# Patient Record
Sex: MALE | Race: WHITE | NOT HISPANIC OR LATINO | Employment: OTHER | URBAN - METROPOLITAN AREA
[De-identification: names, ages, dates, MRNs, and addresses within clinical notes are randomized per-mention and may not be internally consistent; named-entity substitution may affect disease eponyms.]

---

## 2021-01-01 ENCOUNTER — APPOINTMENT (OUTPATIENT)
Dept: CT IMAGING | Facility: HOSPITAL | Age: 71
End: 2021-01-01

## 2021-01-01 ENCOUNTER — APPOINTMENT (OUTPATIENT)
Dept: GENERAL RADIOLOGY | Facility: HOSPITAL | Age: 71
End: 2021-01-01

## 2021-01-01 ENCOUNTER — APPOINTMENT (OUTPATIENT)
Dept: CARDIOLOGY | Facility: HOSPITAL | Age: 71
End: 2021-01-01

## 2021-01-01 ENCOUNTER — HOSPITAL ENCOUNTER (INPATIENT)
Facility: HOSPITAL | Age: 71
LOS: 2 days | End: 2021-02-02
Attending: EMERGENCY MEDICINE | Admitting: INTERNAL MEDICINE

## 2021-01-01 VITALS
BODY MASS INDEX: 29.07 KG/M2 | TEMPERATURE: 99.4 F | WEIGHT: 185.19 LBS | SYSTOLIC BLOOD PRESSURE: 98 MMHG | HEART RATE: 121 BPM | HEIGHT: 67 IN | RESPIRATION RATE: 28 BRPM | OXYGEN SATURATION: 95 % | DIASTOLIC BLOOD PRESSURE: 69 MMHG

## 2021-01-01 DIAGNOSIS — E87.6 HYPOKALEMIA: ICD-10-CM

## 2021-01-01 DIAGNOSIS — J96.02 ACUTE RESPIRATORY FAILURE WITH HYPERCAPNIA (HCC): Primary | ICD-10-CM

## 2021-01-01 DIAGNOSIS — R77.8 ELEVATED TROPONIN: ICD-10-CM

## 2021-01-01 DIAGNOSIS — A41.9 SEPTIC SHOCK (HCC): ICD-10-CM

## 2021-01-01 DIAGNOSIS — R65.21 SEPTIC SHOCK (HCC): ICD-10-CM

## 2021-01-01 DIAGNOSIS — R79.89 ELEVATED D-DIMER: ICD-10-CM

## 2021-01-01 LAB
ALBUMIN SERPL-MCNC: 1.8 G/DL (ref 3.5–5.2)
ALBUMIN SERPL-MCNC: 2.3 G/DL (ref 3.5–5.2)
ALBUMIN SERPL-MCNC: 2.3 G/DL (ref 3.5–5.2)
ALBUMIN SERPL-MCNC: 2.4 G/DL (ref 3.5–5.2)
ALBUMIN/GLOB SERPL: 1 G/DL
ALBUMIN/GLOB SERPL: 1.1 G/DL
ALP SERPL-CCNC: 139 U/L (ref 39–117)
ALP SERPL-CCNC: 81 U/L (ref 39–117)
ALT SERPL W P-5'-P-CCNC: 46 U/L (ref 1–41)
ALT SERPL W P-5'-P-CCNC: >7000 U/L (ref 1–41)
ANION GAP SERPL CALCULATED.3IONS-SCNC: 13 MMOL/L (ref 5–15)
ANION GAP SERPL CALCULATED.3IONS-SCNC: 20 MMOL/L (ref 5–15)
ANION GAP SERPL CALCULATED.3IONS-SCNC: 22 MMOL/L (ref 5–15)
ANION GAP SERPL CALCULATED.3IONS-SCNC: 23 MMOL/L (ref 5–15)
ANION GAP SERPL CALCULATED.3IONS-SCNC: 26 MMOL/L (ref 5–15)
APTT PPP: 137.2 SECONDS (ref 61–76.5)
APTT PPP: 32.5 SECONDS (ref 24–31)
APTT PPP: 54.7 SECONDS (ref 61–76.5)
APTT PPP: 57 SECONDS (ref 61–76.5)
APTT PPP: >139 SECONDS (ref 61–76.5)
ARTERIAL PATENCY WRIST A: ABNORMAL
ARTERIAL PATENCY WRIST A: POSITIVE
AST SERPL-CCNC: 60 U/L (ref 1–40)
AST SERPL-CCNC: >7000 U/L (ref 1–40)
ATMOSPHERIC PRESS: ABNORMAL MM[HG]
B PARAPERT DNA SPEC QL NAA+PROBE: NOT DETECTED
B PERT DNA SPEC QL NAA+PROBE: NOT DETECTED
BACTERIA SPEC AEROBE CULT: NO GROWTH
BACTERIA SPEC RESP CULT: NORMAL
BACTERIA UR QL AUTO: ABNORMAL /HPF
BASE EXCESS BLDA CALC-SCNC: -13 MMOL/L (ref 0–3)
BASE EXCESS BLDA CALC-SCNC: -13.4 MMOL/L (ref 0–3)
BASE EXCESS BLDA CALC-SCNC: -13.5 MMOL/L (ref 0–3)
BASE EXCESS BLDA CALC-SCNC: -4.5 MMOL/L (ref 0–3)
BASE EXCESS BLDA CALC-SCNC: -5.8 MMOL/L (ref 0–3)
BASE EXCESS BLDA CALC-SCNC: -5.9 MMOL/L (ref 0–3)
BASE EXCESS BLDA CALC-SCNC: -7 MMOL/L (ref 0–3)
BASE EXCESS BLDA CALC-SCNC: -7.3 MMOL/L (ref 0–3)
BASE EXCESS BLDA CALC-SCNC: -8.1 MMOL/L (ref 0–3)
BASOPHILS # BLD AUTO: 0 10*3/MM3 (ref 0–0.2)
BASOPHILS # BLD AUTO: 0.1 10*3/MM3 (ref 0–0.2)
BASOPHILS # BLD AUTO: 0.1 10*3/MM3 (ref 0–0.2)
BASOPHILS NFR BLD AUTO: 0.1 % (ref 0–1.5)
BASOPHILS NFR BLD AUTO: 0.5 % (ref 0–1.5)
BASOPHILS NFR BLD AUTO: 0.7 % (ref 0–1.5)
BDY SITE: ABNORMAL
BH CV ECHO MEAS - % IVS THICK: 71.2 %
BH CV ECHO MEAS - % LVPW THICK: 74 %
BH CV ECHO MEAS - ACS: 1.5 CM
BH CV ECHO MEAS - AO MAX PG (FULL): 7.5 MMHG
BH CV ECHO MEAS - AO MAX PG: 9.8 MMHG
BH CV ECHO MEAS - AO MEAN PG (FULL): 3.3 MMHG
BH CV ECHO MEAS - AO MEAN PG: 4.7 MMHG
BH CV ECHO MEAS - AO ROOT AREA (BSA CORRECTED): 1.5
BH CV ECHO MEAS - AO ROOT AREA: 7.2 CM^2
BH CV ECHO MEAS - AO ROOT DIAM: 3 CM
BH CV ECHO MEAS - AO V2 MAX: 154.1 CM/SEC
BH CV ECHO MEAS - AO V2 MEAN: 101.1 CM/SEC
BH CV ECHO MEAS - AO V2 VTI: 23.5 CM
BH CV ECHO MEAS - AVA(I,A): 1.8 CM^2
BH CV ECHO MEAS - AVA(I,D): 1.8 CM^2
BH CV ECHO MEAS - AVA(V,A): 1.8 CM^2
BH CV ECHO MEAS - AVA(V,D): 1.8 CM^2
BH CV ECHO MEAS - BSA(HAYCOCK): 2.1 M^2
BH CV ECHO MEAS - BSA: 2 M^2
BH CV ECHO MEAS - BZI_BMI: 30.4 KILOGRAMS/M^2
BH CV ECHO MEAS - BZI_METRIC_HEIGHT: 170.2 CM
BH CV ECHO MEAS - BZI_METRIC_WEIGHT: 88 KG
BH CV ECHO MEAS - EDV(CUBED): 176.5 ML
BH CV ECHO MEAS - EDV(TEICH): 154.3 ML
BH CV ECHO MEAS - EF(CUBED): 87.9 %
BH CV ECHO MEAS - EF(TEICH): 81.3 %
BH CV ECHO MEAS - ESV(CUBED): 21.3 ML
BH CV ECHO MEAS - ESV(TEICH): 28.9 ML
BH CV ECHO MEAS - FS: 50.6 %
BH CV ECHO MEAS - IVS/LVPW: 1.2
BH CV ECHO MEAS - IVSD: 1.2 CM
BH CV ECHO MEAS - IVSS: 2.1 CM
BH CV ECHO MEAS - LA DIMENSION(2D): 4.2 CM
BH CV ECHO MEAS - LV MASS(C)D: 254.6 GRAMS
BH CV ECHO MEAS - LV MASS(C)DI: 127.5 GRAMS/M^2
BH CV ECHO MEAS - LV MASS(C)S: 224.1 GRAMS
BH CV ECHO MEAS - LV MASS(C)SI: 112.3 GRAMS/M^2
BH CV ECHO MEAS - LV MAX PG: 2.3 MMHG
BH CV ECHO MEAS - LV MEAN PG: 1.4 MMHG
BH CV ECHO MEAS - LV V1 MAX: 76.5 CM/SEC
BH CV ECHO MEAS - LV V1 MEAN: 55.6 CM/SEC
BH CV ECHO MEAS - LV V1 VTI: 12.1 CM
BH CV ECHO MEAS - LVIDD: 5.6 CM
BH CV ECHO MEAS - LVIDS: 2.8 CM
BH CV ECHO MEAS - LVOT AREA: 3.5 CM^2
BH CV ECHO MEAS - LVOT DIAM: 2.1 CM
BH CV ECHO MEAS - LVPWD: 1 CM
BH CV ECHO MEAS - LVPWS: 1.7 CM
BH CV ECHO MEAS - MV A MAX VEL: 107.4 CM/SEC
BH CV ECHO MEAS - MV DEC SLOPE: 806.5 CM/SEC^2
BH CV ECHO MEAS - MV DEC TIME: 0.18 SEC
BH CV ECHO MEAS - MV E MAX VEL: 71.6 CM/SEC
BH CV ECHO MEAS - MV E/A: 0.67
BH CV ECHO MEAS - MV MAX PG: 11.5 MMHG
BH CV ECHO MEAS - MV MEAN PG: 4.9 MMHG
BH CV ECHO MEAS - MV V2 MAX: 169.4 CM/SEC
BH CV ECHO MEAS - MV V2 MEAN: 103.4 CM/SEC
BH CV ECHO MEAS - MV V2 VTI: 36.8 CM
BH CV ECHO MEAS - MVA(VTI): 1.2 CM^2
BH CV ECHO MEAS - PA ACC TIME: 0.08 SEC
BH CV ECHO MEAS - PA MAX PG (FULL): 1.7 MMHG
BH CV ECHO MEAS - PA MAX PG: 3 MMHG
BH CV ECHO MEAS - PA MEAN PG (FULL): 1.2 MMHG
BH CV ECHO MEAS - PA MEAN PG: 1.9 MMHG
BH CV ECHO MEAS - PA PR(ACCEL): 44.7 MMHG
BH CV ECHO MEAS - PA V2 MAX: 87.2 CM/SEC
BH CV ECHO MEAS - PA V2 MEAN: 65.9 CM/SEC
BH CV ECHO MEAS - PA V2 VTI: 12.5 CM
BH CV ECHO MEAS - RAP SYSTOLE: 3 MMHG
BH CV ECHO MEAS - RV MAX PG: 1.4 MMHG
BH CV ECHO MEAS - RV MEAN PG: 0.64 MMHG
BH CV ECHO MEAS - RV V1 MAX: 58.1 CM/SEC
BH CV ECHO MEAS - RV V1 MEAN: 38 CM/SEC
BH CV ECHO MEAS - RV V1 VTI: 6.2 CM
BH CV ECHO MEAS - RVDD: 2.2 CM
BH CV ECHO MEAS - RVSP: 34.3 MMHG
BH CV ECHO MEAS - SI(AO): 85.1 ML/M^2
BH CV ECHO MEAS - SI(CUBED): 77.7 ML/M^2
BH CV ECHO MEAS - SI(LVOT): 21.6 ML/M^2
BH CV ECHO MEAS - SI(TEICH): 62.8 ML/M^2
BH CV ECHO MEAS - SV(AO): 169.8 ML
BH CV ECHO MEAS - SV(CUBED): 155.2 ML
BH CV ECHO MEAS - SV(LVOT): 43 ML
BH CV ECHO MEAS - SV(TEICH): 125.4 ML
BH CV ECHO MEAS - TR MAX VEL: 279.4 CM/SEC
BILIRUB SERPL-MCNC: 1.1 MG/DL (ref 0–1.2)
BILIRUB SERPL-MCNC: <0.2 MG/DL (ref 0–1.2)
BILIRUB UR QL STRIP: NEGATIVE
BUN SERPL-MCNC: 13 MG/DL (ref 8–23)
BUN SERPL-MCNC: 31 MG/DL (ref 8–23)
BUN SERPL-MCNC: 34 MG/DL (ref 8–23)
BUN SERPL-MCNC: 37 MG/DL (ref 8–23)
BUN SERPL-MCNC: 38 MG/DL (ref 8–23)
BUN/CREAT SERPL: 10.1 (ref 7–25)
BUN/CREAT SERPL: 10.9 (ref 7–25)
BUN/CREAT SERPL: 10.9 (ref 7–25)
BUN/CREAT SERPL: 11.5 (ref 7–25)
BUN/CREAT SERPL: 19.1 (ref 7–25)
C PNEUM DNA NPH QL NAA+NON-PROBE: NOT DETECTED
CA-I SERPL ISE-MCNC: 0.81 MMOL/L (ref 1.2–1.3)
CA-I SERPL ISE-MCNC: 0.85 MMOL/L (ref 1.2–1.3)
CA-I SERPL ISE-MCNC: 0.86 MMOL/L (ref 1.2–1.3)
CA-I SERPL ISE-MCNC: 1.15 MMOL/L (ref 1.2–1.3)
CALCIUM SPEC-SCNC: 4.9 MG/DL (ref 8.6–10.5)
CALCIUM SPEC-SCNC: 6.2 MG/DL (ref 8.6–10.5)
CALCIUM SPEC-SCNC: 6.4 MG/DL (ref 8.6–10.5)
CALCIUM SPEC-SCNC: 6.5 MG/DL (ref 8.6–10.5)
CALCIUM SPEC-SCNC: 6.6 MG/DL (ref 8.6–10.5)
CHLORIDE SERPL-SCNC: 117 MMOL/L (ref 98–107)
CHLORIDE SERPL-SCNC: 93 MMOL/L (ref 98–107)
CHLORIDE SERPL-SCNC: 94 MMOL/L (ref 98–107)
CHLORIDE SERPL-SCNC: 97 MMOL/L (ref 98–107)
CHLORIDE SERPL-SCNC: 98 MMOL/L (ref 98–107)
CHOLEST SERPL-MCNC: 131 MG/DL (ref 0–200)
CK SERPL-CCNC: 728 U/L (ref 20–200)
CLARITY UR: ABNORMAL
CO2 BLDA-SCNC: 16.7 MMOL/L (ref 22–29)
CO2 BLDA-SCNC: 17 MMOL/L (ref 22–29)
CO2 BLDA-SCNC: 22.4 MMOL/L (ref 22–29)
CO2 BLDA-SCNC: 23.2 MMOL/L (ref 22–29)
CO2 BLDA-SCNC: 25.1 MMOL/L (ref 22–29)
CO2 BLDA-SCNC: 25.3 MMOL/L (ref 22–29)
CO2 BLDA-SCNC: 26 MMOL/L (ref 22–29)
CO2 BLDA-SCNC: 27.8 MMOL/L (ref 22–29)
CO2 BLDA-SCNC: 29.3 MMOL/L (ref 22–29)
CO2 SERPL-SCNC: 16 MMOL/L (ref 22–29)
CO2 SERPL-SCNC: 17 MMOL/L (ref 22–29)
CO2 SERPL-SCNC: 19 MMOL/L (ref 22–29)
CO2 SERPL-SCNC: 21 MMOL/L (ref 22–29)
CO2 SERPL-SCNC: 21 MMOL/L (ref 22–29)
COLOR UR: ABNORMAL
CREAT SERPL-MCNC: 0.68 MG/DL (ref 0.76–1.27)
CREAT SERPL-MCNC: 2.69 MG/DL (ref 0.76–1.27)
CREAT SERPL-MCNC: 3.11 MG/DL (ref 0.76–1.27)
CREAT SERPL-MCNC: 3.38 MG/DL (ref 0.76–1.27)
CREAT SERPL-MCNC: 3.76 MG/DL (ref 0.76–1.27)
CRP SERPL-MCNC: 1.1 MG/DL (ref 0–0.5)
D DIMER PPP FEU-MCNC: 9.51 MG/L (FEU) (ref 0–0.59)
D-LACTATE SERPL-SCNC: 3.5 MMOL/L (ref 0.5–2)
D-LACTATE SERPL-SCNC: 3.6 MMOL/L (ref 0.5–2)
D-LACTATE SERPL-SCNC: 6.5 MMOL/L (ref 0.5–2)
D-LACTATE SERPL-SCNC: 8.8 MMOL/L (ref 0.5–2)
DEPRECATED RDW RBC AUTO: 52.9 FL (ref 37–54)
DEPRECATED RDW RBC AUTO: 53.8 FL (ref 37–54)
DEPRECATED RDW RBC AUTO: 54.7 FL (ref 37–54)
EOSINOPHIL # BLD AUTO: 0 10*3/MM3 (ref 0–0.4)
EOSINOPHIL # BLD AUTO: 0 10*3/MM3 (ref 0–0.4)
EOSINOPHIL # BLD AUTO: 0.2 10*3/MM3 (ref 0–0.4)
EOSINOPHIL NFR BLD AUTO: 0 % (ref 0.3–6.2)
EOSINOPHIL NFR BLD AUTO: 0.2 % (ref 0.3–6.2)
EOSINOPHIL NFR BLD AUTO: 2.3 % (ref 0.3–6.2)
ERYTHROCYTE [DISTWIDTH] IN BLOOD BY AUTOMATED COUNT: 15.9 % (ref 12.3–15.4)
ERYTHROCYTE [DISTWIDTH] IN BLOOD BY AUTOMATED COUNT: 16 % (ref 12.3–15.4)
ERYTHROCYTE [DISTWIDTH] IN BLOOD BY AUTOMATED COUNT: 16.4 % (ref 12.3–15.4)
ETHANOL UR QL: <0.01 %
FERRITIN SERPL-MCNC: 144.5 NG/ML (ref 30–400)
FLUAV SUBTYP SPEC NAA+PROBE: NOT DETECTED
FLUBV RNA ISLT QL NAA+PROBE: NOT DETECTED
GFR SERPL CREATININE-BSD FRML MDRD: 115 ML/MIN/1.73
GFR SERPL CREATININE-BSD FRML MDRD: 16 ML/MIN/1.73
GFR SERPL CREATININE-BSD FRML MDRD: 18 ML/MIN/1.73
GFR SERPL CREATININE-BSD FRML MDRD: 20 ML/MIN/1.73
GFR SERPL CREATININE-BSD FRML MDRD: 24 ML/MIN/1.73
GLOBULIN UR ELPH-MCNC: 1.6 GM/DL
GLOBULIN UR ELPH-MCNC: 2.2 GM/DL
GLUCOSE BLDC GLUCOMTR-MCNC: 131 MG/DL (ref 70–105)
GLUCOSE BLDC GLUCOMTR-MCNC: 144 MG/DL (ref 70–105)
GLUCOSE BLDC GLUCOMTR-MCNC: 183 MG/DL (ref 70–105)
GLUCOSE BLDC GLUCOMTR-MCNC: 186 MG/DL (ref 70–105)
GLUCOSE BLDC GLUCOMTR-MCNC: 186 MG/DL (ref 70–105)
GLUCOSE BLDC GLUCOMTR-MCNC: 220 MG/DL (ref 70–105)
GLUCOSE BLDC GLUCOMTR-MCNC: 235 MG/DL (ref 70–105)
GLUCOSE BLDC GLUCOMTR-MCNC: 273 MG/DL (ref 70–105)
GLUCOSE BLDC GLUCOMTR-MCNC: 280 MG/DL (ref 74–100)
GLUCOSE BLDC GLUCOMTR-MCNC: 385 MG/DL (ref 74–100)
GLUCOSE SERPL-MCNC: 184 MG/DL (ref 65–99)
GLUCOSE SERPL-MCNC: 184 MG/DL (ref 65–99)
GLUCOSE SERPL-MCNC: 198 MG/DL (ref 65–99)
GLUCOSE SERPL-MCNC: 303 MG/DL (ref 65–99)
GLUCOSE SERPL-MCNC: 319 MG/DL (ref 65–99)
GLUCOSE UR STRIP-MCNC: ABNORMAL MG/DL
GRAM STN SPEC: NORMAL
HADV DNA SPEC NAA+PROBE: NOT DETECTED
HBA1C MFR BLD: 5.7 % (ref 3.5–5.6)
HCO3 BLDA-SCNC: 15.4 MMOL/L (ref 21–28)
HCO3 BLDA-SCNC: 15.6 MMOL/L (ref 21–28)
HCO3 BLDA-SCNC: 20.8 MMOL/L (ref 21–28)
HCO3 BLDA-SCNC: 21.7 MMOL/L (ref 21–28)
HCO3 BLDA-SCNC: 22.2 MMOL/L (ref 21–28)
HCO3 BLDA-SCNC: 22.9 MMOL/L (ref 21–28)
HCO3 BLDA-SCNC: 24.3 MMOL/L (ref 21–28)
HCO3 BLDA-SCNC: 25.1 MMOL/L (ref 21–28)
HCO3 BLDA-SCNC: 26.6 MMOL/L (ref 21–28)
HCOV 229E RNA SPEC QL NAA+PROBE: NOT DETECTED
HCOV HKU1 RNA SPEC QL NAA+PROBE: NOT DETECTED
HCOV NL63 RNA SPEC QL NAA+PROBE: NOT DETECTED
HCOV OC43 RNA SPEC QL NAA+PROBE: NOT DETECTED
HCT VFR BLD AUTO: 42.4 % (ref 37.5–51)
HCT VFR BLD AUTO: 43.4 % (ref 37.5–51)
HCT VFR BLD AUTO: 46.5 % (ref 37.5–51)
HDLC SERPL-MCNC: 32 MG/DL (ref 40–60)
HEMODILUTION: NO
HGB BLD-MCNC: 13.6 G/DL (ref 13–17.7)
HGB BLD-MCNC: 13.9 G/DL (ref 13–17.7)
HGB BLD-MCNC: 15.1 G/DL (ref 13–17.7)
HGB UR QL STRIP.AUTO: ABNORMAL
HMPV RNA NPH QL NAA+NON-PROBE: NOT DETECTED
HOLD SPECIMEN: NORMAL
HOLD SPECIMEN: NORMAL
HPIV1 RNA SPEC QL NAA+PROBE: NOT DETECTED
HPIV2 RNA SPEC QL NAA+PROBE: NOT DETECTED
HPIV3 RNA NPH QL NAA+PROBE: NOT DETECTED
HPIV4 P GENE NPH QL NAA+PROBE: NOT DETECTED
HYALINE CASTS UR QL AUTO: ABNORMAL /LPF
INHALED O2 CONCENTRATION: 100 %
INR PPP: 1.23 (ref 0.93–1.1)
KETONES UR QL STRIP: NEGATIVE
L PNEUMO1 AG UR QL IA: NEGATIVE
LACTATE HOLD SPECIMEN: NORMAL
LDH SERPL-CCNC: 266 U/L (ref 135–225)
LDLC SERPL CALC-MCNC: 88 MG/DL (ref 0–100)
LDLC/HDLC SERPL: 2.78 {RATIO}
LEUKOCYTE ESTERASE UR QL STRIP.AUTO: ABNORMAL
LYMPHOCYTES # BLD AUTO: 0.5 10*3/MM3 (ref 0.7–3.1)
LYMPHOCYTES # BLD AUTO: 0.6 10*3/MM3 (ref 0.7–3.1)
LYMPHOCYTES # BLD AUTO: 2.9 10*3/MM3 (ref 0.7–3.1)
LYMPHOCYTES NFR BLD AUTO: 3.4 % (ref 19.6–45.3)
LYMPHOCYTES NFR BLD AUTO: 3.5 % (ref 19.6–45.3)
LYMPHOCYTES NFR BLD AUTO: 36.7 % (ref 19.6–45.3)
M PNEUMO IGG SER IA-ACNC: NOT DETECTED
MAGNESIUM SERPL-MCNC: 1.1 MG/DL (ref 1.6–2.4)
MAGNESIUM SERPL-MCNC: 2.1 MG/DL (ref 1.6–2.4)
MAGNESIUM SERPL-MCNC: 2.2 MG/DL (ref 1.6–2.4)
MAGNESIUM SERPL-MCNC: 2.2 MG/DL (ref 1.6–2.4)
MAGNESIUM SERPL-MCNC: 2.3 MG/DL (ref 1.6–2.4)
MAXIMAL PREDICTED HEART RATE: 150 BPM
MCH RBC QN AUTO: 29.7 PG (ref 26.6–33)
MCH RBC QN AUTO: 29.7 PG (ref 26.6–33)
MCH RBC QN AUTO: 30.6 PG (ref 26.6–33)
MCHC RBC AUTO-ENTMCNC: 32 G/DL (ref 31.5–35.7)
MCHC RBC AUTO-ENTMCNC: 32 G/DL (ref 31.5–35.7)
MCHC RBC AUTO-ENTMCNC: 32.5 G/DL (ref 31.5–35.7)
MCV RBC AUTO: 92.6 FL (ref 79–97)
MCV RBC AUTO: 92.8 FL (ref 79–97)
MCV RBC AUTO: 94.2 FL (ref 79–97)
MODALITY: ABNORMAL
MONOCYTES # BLD AUTO: 0.2 10*3/MM3 (ref 0.1–0.9)
MONOCYTES # BLD AUTO: 0.4 10*3/MM3 (ref 0.1–0.9)
MONOCYTES # BLD AUTO: 0.4 10*3/MM3 (ref 0.1–0.9)
MONOCYTES NFR BLD AUTO: 1.7 % (ref 5–12)
MONOCYTES NFR BLD AUTO: 2.2 % (ref 5–12)
MONOCYTES NFR BLD AUTO: 5 % (ref 5–12)
MRSA DNA SPEC QL NAA+PROBE: NORMAL
MUCOUS THREADS URNS QL MICRO: ABNORMAL /HPF
MYOGLOBIN SERPL-MCNC: 1045 NG/ML (ref 28–72)
NEUTROPHILS NFR BLD AUTO: 12.7 10*3/MM3 (ref 1.7–7)
NEUTROPHILS NFR BLD AUTO: 16.3 10*3/MM3 (ref 1.7–7)
NEUTROPHILS NFR BLD AUTO: 4.4 10*3/MM3 (ref 1.7–7)
NEUTROPHILS NFR BLD AUTO: 55.3 % (ref 42.7–76)
NEUTROPHILS NFR BLD AUTO: 94.2 % (ref 42.7–76)
NEUTROPHILS NFR BLD AUTO: 94.2 % (ref 42.7–76)
NITRITE UR QL STRIP: NEGATIVE
NRBC BLD AUTO-RTO: 0.1 /100 WBC (ref 0–0.2)
NRBC BLD AUTO-RTO: 0.1 /100 WBC (ref 0–0.2)
NRBC BLD AUTO-RTO: 0.3 /100 WBC (ref 0–0.2)
NT-PROBNP SERPL-MCNC: 2102 PG/ML (ref 0–900)
PCO2 BLDA: 101.6 MM HG (ref 35–48)
PCO2 BLDA: 43.2 MM HG (ref 35–48)
PCO2 BLDA: 46.8 MM HG (ref 35–48)
PCO2 BLDA: 49.3 MM HG (ref 35–48)
PCO2 BLDA: 50.6 MM HG (ref 35–48)
PCO2 BLDA: 57.6 MM HG (ref 35–48)
PCO2 BLDA: 71.5 MM HG (ref 35–48)
PCO2 BLDA: 88.1 MM HG (ref 35–48)
PCO2 BLDA: 88.1 MM HG (ref 35–48)
PEEP RESPIRATORY: 10 CM[H2O]
PEEP RESPIRATORY: 10 CM[H2O]
PEEP RESPIRATORY: 14 CM[H2O]
PEEP RESPIRATORY: 7 CM[H2O]
PEEP RESPIRATORY: 7 CM[H2O]
PEEP RESPIRATORY: 8 CM[H2O]
PH BLDA: 6.95 PH UNITS (ref 7.35–7.45)
PH BLDA: 7.06 PH UNITS (ref 7.35–7.45)
PH BLDA: 7.09 PH UNITS (ref 7.35–7.45)
PH BLDA: 7.11 PH UNITS (ref 7.35–7.45)
PH BLDA: 7.13 PH UNITS (ref 7.35–7.45)
PH BLDA: 7.16 PH UNITS (ref 7.35–7.45)
PH BLDA: 7.22 PH UNITS (ref 7.35–7.45)
PH BLDA: 7.23 PH UNITS (ref 7.35–7.45)
PH BLDA: 7.25 PH UNITS (ref 7.35–7.45)
PH UR STRIP.AUTO: 7 [PH] (ref 5–8)
PHOSPHATE SERPL-MCNC: 4.2 MG/DL (ref 2.5–4.5)
PHOSPHATE SERPL-MCNC: 7.9 MG/DL (ref 2.5–4.5)
PHOSPHATE SERPL-MCNC: 8 MG/DL (ref 2.5–4.5)
PHOSPHATE SERPL-MCNC: 8.1 MG/DL (ref 2.5–4.5)
PHOSPHATE SERPL-MCNC: 8.3 MG/DL (ref 2.5–4.5)
PLATELET # BLD AUTO: 118 10*3/MM3 (ref 140–450)
PLATELET # BLD AUTO: 163 10*3/MM3 (ref 140–450)
PLATELET # BLD AUTO: 271 10*3/MM3 (ref 140–450)
PMV BLD AUTO: 7.8 FL (ref 6–12)
PMV BLD AUTO: 7.9 FL (ref 6–12)
PMV BLD AUTO: 8.7 FL (ref 6–12)
PO2 BLDA: 102.4 MM HG (ref 83–108)
PO2 BLDA: 111.4 MM HG (ref 83–108)
PO2 BLDA: 142.9 MM HG (ref 83–108)
PO2 BLDA: 42.3 MM HG (ref 83–108)
PO2 BLDA: 61.1 MM HG (ref 83–108)
PO2 BLDA: 65 MM HG (ref 83–108)
PO2 BLDA: 69.9 MM HG (ref 83–108)
PO2 BLDA: 77.8 MM HG (ref 83–108)
PO2 BLDA: 80.2 MM HG (ref 83–108)
POTASSIUM SERPL-SCNC: 2.4 MMOL/L (ref 3.5–5.2)
POTASSIUM SERPL-SCNC: 4.4 MMOL/L (ref 3.5–5.2)
POTASSIUM SERPL-SCNC: 4.6 MMOL/L (ref 3.5–5.2)
PROCALCITONIN SERPL-MCNC: 0.03 NG/ML (ref 0–0.25)
PROT SERPL-MCNC: 3.4 G/DL (ref 6–8.5)
PROT SERPL-MCNC: 4.5 G/DL (ref 6–8.5)
PROT UR QL STRIP: ABNORMAL
PROTHROMBIN TIME: 13.4 SECONDS (ref 9.6–11.7)
QT INTERVAL: 349 MS
QT INTERVAL: 397 MS
RBC # BLD AUTO: 4.57 10*6/MM3 (ref 4.14–5.8)
RBC # BLD AUTO: 4.69 10*6/MM3 (ref 4.14–5.8)
RBC # BLD AUTO: 4.94 10*6/MM3 (ref 4.14–5.8)
RBC # UR: ABNORMAL /HPF
REF LAB TEST METHOD: ABNORMAL
RESPIRATORY RATE: 26
RESPIRATORY RATE: 26
RESPIRATORY RATE: 28
RESPIRATORY RATE: 32
RESPIRATORY RATE: 32
RHINOVIRUS RNA SPEC NAA+PROBE: NOT DETECTED
RSV RNA NPH QL NAA+NON-PROBE: NOT DETECTED
S PNEUM AG SPEC QL LA: POSITIVE
SAO2 % BLDCOA: 67.7 % (ref 94–98)
SAO2 % BLDCOA: 81.5 % (ref 94–98)
SAO2 % BLDCOA: 82.3 % (ref 94–98)
SAO2 % BLDCOA: 90.1 % (ref 94–98)
SAO2 % BLDCOA: 90.6 % (ref 94–98)
SAO2 % BLDCOA: 92.3 % (ref 94–98)
SAO2 % BLDCOA: 95.2 % (ref 94–98)
SAO2 % BLDCOA: 95.8 % (ref 94–98)
SAO2 % BLDCOA: 96.7 % (ref 94–98)
SARS-COV-2 RNA NPH QL NAA+NON-PROBE: NOT DETECTED
SODIUM SERPL-SCNC: 137 MMOL/L (ref 136–145)
SODIUM SERPL-SCNC: 137 MMOL/L (ref 136–145)
SODIUM SERPL-SCNC: 138 MMOL/L (ref 136–145)
SODIUM SERPL-SCNC: 138 MMOL/L (ref 136–145)
SODIUM SERPL-SCNC: 147 MMOL/L (ref 136–145)
SP GR UR STRIP: 1.03 (ref 1–1.03)
SQUAMOUS #/AREA URNS HPF: ABNORMAL /HPF
STRESS TARGET HR: 128 BPM
TRIGL SERPL-MCNC: 51 MG/DL (ref 0–150)
TRIGL SERPL-MCNC: 97 MG/DL (ref 0–150)
TROPONIN T SERPL-MCNC: 1.03 NG/ML (ref 0–0.03)
TROPONIN T SERPL-MCNC: 1.51 NG/ML (ref 0–0.03)
TROPONIN T SERPL-MCNC: 2.3 NG/ML (ref 0–0.03)
TROPONIN T SERPL-MCNC: 2.3 NG/ML (ref 0–0.03)
URINE MYOGLOBIN, QUALITATIVE: POSITIVE
UROBILINOGEN UR QL STRIP: ABNORMAL
VENTILATOR MODE: ABNORMAL
VLDLC SERPL-MCNC: 11 MG/DL (ref 5–40)
VT ON VENT VENT: 500 ML
VT ON VENT VENT: 500 ML
VT ON VENT VENT: 550 ML
WBC # BLD AUTO: 13.5 10*3/MM3 (ref 3.4–10.8)
WBC # BLD AUTO: 17.3 10*3/MM3 (ref 3.4–10.8)
WBC # BLD AUTO: 8 10*3/MM3 (ref 3.4–10.8)
WBC UR QL AUTO: ABNORMAL /HPF
WHOLE BLOOD HOLD SPECIMEN: NORMAL
WHOLE BLOOD HOLD SPECIMEN: NORMAL

## 2021-01-01 PROCEDURE — 25010000002 MIDAZOLAM PER 1 MG: Performed by: EMERGENCY MEDICINE

## 2021-01-01 PROCEDURE — 25010000002 MIDAZOLAM 50 MG/10ML SOLUTION: Performed by: INTERNAL MEDICINE

## 2021-01-01 PROCEDURE — 25010000002 CEFTRIAXONE PER 250 MG: Performed by: EMERGENCY MEDICINE

## 2021-01-01 PROCEDURE — 94640 AIRWAY INHALATION TREATMENT: CPT

## 2021-01-01 PROCEDURE — 82962 GLUCOSE BLOOD TEST: CPT

## 2021-01-01 PROCEDURE — 82330 ASSAY OF CALCIUM: CPT | Performed by: INTERNAL MEDICINE

## 2021-01-01 PROCEDURE — 25010000002 FENTANYL CITRATE (PF) 100 MCG/2ML SOLUTION: Performed by: NURSE PRACTITIONER

## 2021-01-01 PROCEDURE — 36600 WITHDRAWAL OF ARTERIAL BLOOD: CPT

## 2021-01-01 PROCEDURE — 25010000003 POTASSIUM CHLORIDE 10 MEQ/100ML SOLUTION: Performed by: EMERGENCY MEDICINE

## 2021-01-01 PROCEDURE — 81001 URINALYSIS AUTO W/SCOPE: CPT | Performed by: EMERGENCY MEDICINE

## 2021-01-01 PROCEDURE — 83735 ASSAY OF MAGNESIUM: CPT | Performed by: EMERGENCY MEDICINE

## 2021-01-01 PROCEDURE — 71045 X-RAY EXAM CHEST 1 VIEW: CPT

## 2021-01-01 PROCEDURE — 99233 SBSQ HOSP IP/OBS HIGH 50: CPT | Performed by: INTERNAL MEDICINE

## 2021-01-01 PROCEDURE — 80048 BASIC METABOLIC PNL TOTAL CA: CPT | Performed by: NURSE PRACTITIONER

## 2021-01-01 PROCEDURE — 82803 BLOOD GASES ANY COMBINATION: CPT

## 2021-01-01 PROCEDURE — 94799 UNLISTED PULMONARY SVC/PX: CPT

## 2021-01-01 PROCEDURE — 87070 CULTURE OTHR SPECIMN AEROBIC: CPT | Performed by: NURSE PRACTITIONER

## 2021-01-01 PROCEDURE — 85730 THROMBOPLASTIN TIME PARTIAL: CPT | Performed by: INTERNAL MEDICINE

## 2021-01-01 PROCEDURE — 85025 COMPLETE CBC W/AUTO DIFF WBC: CPT | Performed by: EMERGENCY MEDICINE

## 2021-01-01 PROCEDURE — 25010000002 HYDROCORTISONE SODIUM SUCCINATE 100 MG RECONSTITUTED SOLUTION: Performed by: INTERNAL MEDICINE

## 2021-01-01 PROCEDURE — 86140 C-REACTIVE PROTEIN: CPT | Performed by: EMERGENCY MEDICINE

## 2021-01-01 PROCEDURE — 25010000002 EPOPROSTENOL PER 0.5 MG: Performed by: NURSE PRACTITIONER

## 2021-01-01 PROCEDURE — 99291 CRITICAL CARE FIRST HOUR: CPT

## 2021-01-01 PROCEDURE — 85610 PROTHROMBIN TIME: CPT | Performed by: EMERGENCY MEDICINE

## 2021-01-01 PROCEDURE — 25010000002 LORAZEPAM PER 2 MG: Performed by: NURSE PRACTITIONER

## 2021-01-01 PROCEDURE — 25010000002 HEPARIN (PORCINE) 25000-0.45 UT/250ML-% SOLUTION: Performed by: EMERGENCY MEDICINE

## 2021-01-01 PROCEDURE — 25010000002 METHYLPREDNISOLONE PER 125 MG: Performed by: EMERGENCY MEDICINE

## 2021-01-01 PROCEDURE — 87641 MR-STAPH DNA AMP PROBE: CPT | Performed by: NURSE PRACTITIONER

## 2021-01-01 PROCEDURE — 25010000002 PROPOFOL 10 MG/ML EMULSION: Performed by: NURSE PRACTITIONER

## 2021-01-01 PROCEDURE — 80069 RENAL FUNCTION PANEL: CPT | Performed by: INTERNAL MEDICINE

## 2021-01-01 PROCEDURE — 5A1945Z RESPIRATORY VENTILATION, 24-96 CONSECUTIVE HOURS: ICD-10-PCS | Performed by: INTERNAL MEDICINE

## 2021-01-01 PROCEDURE — 25010000002 MORPHINE PER 10 MG: Performed by: NURSE PRACTITIONER

## 2021-01-01 PROCEDURE — 82550 ASSAY OF CK (CPK): CPT | Performed by: NURSE PRACTITIONER

## 2021-01-01 PROCEDURE — 83605 ASSAY OF LACTIC ACID: CPT

## 2021-01-01 PROCEDURE — 25010000003 AMPICILLIN-SULBACTAM PER 1.5 G: Performed by: INTERNAL MEDICINE

## 2021-01-01 PROCEDURE — 93005 ELECTROCARDIOGRAM TRACING: CPT | Performed by: EMERGENCY MEDICINE

## 2021-01-01 PROCEDURE — 03HY32Z INSERTION OF MONITORING DEVICE INTO UPPER ARTERY, PERCUTANEOUS APPROACH: ICD-10-PCS | Performed by: INTERNAL MEDICINE

## 2021-01-01 PROCEDURE — 84100 ASSAY OF PHOSPHORUS: CPT | Performed by: EMERGENCY MEDICINE

## 2021-01-01 PROCEDURE — 31500 INSERT EMERGENCY AIRWAY: CPT

## 2021-01-01 PROCEDURE — 83880 ASSAY OF NATRIURETIC PEPTIDE: CPT | Performed by: EMERGENCY MEDICINE

## 2021-01-01 PROCEDURE — 0202U NFCT DS 22 TRGT SARS-COV-2: CPT | Performed by: EMERGENCY MEDICINE

## 2021-01-01 PROCEDURE — 85025 COMPLETE CBC W/AUTO DIFF WBC: CPT | Performed by: NURSE PRACTITIONER

## 2021-01-01 PROCEDURE — 94003 VENT MGMT INPAT SUBQ DAY: CPT

## 2021-01-01 PROCEDURE — 83874 ASSAY OF MYOGLOBIN: CPT | Performed by: NURSE PRACTITIONER

## 2021-01-01 PROCEDURE — 83605 ASSAY OF LACTIC ACID: CPT | Performed by: NURSE PRACTITIONER

## 2021-01-01 PROCEDURE — 25010000002 METHYLPREDNISOLONE PER 125 MG: Performed by: NURSE PRACTITIONER

## 2021-01-01 PROCEDURE — 63710000001 INSULIN LISPRO (HUMAN) PER 5 UNITS: Performed by: INTERNAL MEDICINE

## 2021-01-01 PROCEDURE — 84100 ASSAY OF PHOSPHORUS: CPT | Performed by: NURSE PRACTITIONER

## 2021-01-01 PROCEDURE — 25010000002 PHENYLEPHRINE 10 MG/ML SOLUTION: Performed by: NURSE PRACTITIONER

## 2021-01-01 PROCEDURE — 99222 1ST HOSP IP/OBS MODERATE 55: CPT | Performed by: INTERNAL MEDICINE

## 2021-01-01 PROCEDURE — 02HV33Z INSERTION OF INFUSION DEVICE INTO SUPERIOR VENA CAVA, PERCUTANEOUS APPROACH: ICD-10-PCS | Performed by: INTERNAL MEDICINE

## 2021-01-01 PROCEDURE — 82077 ASSAY SPEC XCP UR&BREATH IA: CPT | Performed by: NURSE PRACTITIONER

## 2021-01-01 PROCEDURE — 80053 COMPREHEN METABOLIC PANEL: CPT | Performed by: INTERNAL MEDICINE

## 2021-01-01 PROCEDURE — 25010000002 DOPAMINE PER 40 MG: Performed by: NURSE PRACTITIONER

## 2021-01-01 PROCEDURE — 80061 LIPID PANEL: CPT | Performed by: NURSE PRACTITIONER

## 2021-01-01 PROCEDURE — 87205 SMEAR GRAM STAIN: CPT | Performed by: NURSE PRACTITIONER

## 2021-01-01 PROCEDURE — 74018 RADEX ABDOMEN 1 VIEW: CPT

## 2021-01-01 PROCEDURE — 25010000002 PHENYLEPHRINE 10 MG/ML SOLUTION: Performed by: INTERNAL MEDICINE

## 2021-01-01 PROCEDURE — 87086 URINE CULTURE/COLONY COUNT: CPT | Performed by: EMERGENCY MEDICINE

## 2021-01-01 PROCEDURE — 51702 INSERT TEMP BLADDER CATH: CPT

## 2021-01-01 PROCEDURE — 25010000002 MAGNESIUM SULFATE 2 GM/50ML SOLUTION: Performed by: NURSE PRACTITIONER

## 2021-01-01 PROCEDURE — 93306 TTE W/DOPPLER COMPLETE: CPT

## 2021-01-01 PROCEDURE — 82330 ASSAY OF CALCIUM: CPT | Performed by: EMERGENCY MEDICINE

## 2021-01-01 PROCEDURE — 85379 FIBRIN DEGRADATION QUANT: CPT | Performed by: EMERGENCY MEDICINE

## 2021-01-01 PROCEDURE — 25010000002 FENTANYL CITRATE (PF) 2500 MCG/50ML SOLUTION: Performed by: NURSE PRACTITIONER

## 2021-01-01 PROCEDURE — 25010000002 AZITHROMYCIN PER 500 MG: Performed by: EMERGENCY MEDICINE

## 2021-01-01 PROCEDURE — 25010000002 CEFTRIAXONE PER 250 MG: Performed by: INTERNAL MEDICINE

## 2021-01-01 PROCEDURE — 84100 ASSAY OF PHOSPHORUS: CPT | Performed by: INTERNAL MEDICINE

## 2021-01-01 PROCEDURE — 87899 AGENT NOS ASSAY W/OPTIC: CPT | Performed by: NURSE PRACTITIONER

## 2021-01-01 PROCEDURE — 80053 COMPREHEN METABOLIC PANEL: CPT | Performed by: EMERGENCY MEDICINE

## 2021-01-01 PROCEDURE — 83615 LACTATE (LD) (LDH) ENZYME: CPT | Performed by: NURSE PRACTITIONER

## 2021-01-01 PROCEDURE — 71275 CT ANGIOGRAPHY CHEST: CPT

## 2021-01-01 PROCEDURE — 85730 THROMBOPLASTIN TIME PARTIAL: CPT | Performed by: EMERGENCY MEDICINE

## 2021-01-01 PROCEDURE — 84484 ASSAY OF TROPONIN QUANT: CPT | Performed by: NURSE PRACTITIONER

## 2021-01-01 PROCEDURE — 83735 ASSAY OF MAGNESIUM: CPT | Performed by: INTERNAL MEDICINE

## 2021-01-01 PROCEDURE — 0 IOPAMIDOL PER 1 ML: Performed by: INTERNAL MEDICINE

## 2021-01-01 PROCEDURE — 04HY32Z INSERTION OF MONITORING DEVICE INTO LOWER ARTERY, PERCUTANEOUS APPROACH: ICD-10-PCS | Performed by: INTERNAL MEDICINE

## 2021-01-01 PROCEDURE — C1751 CATH, INF, PER/CENT/MIDLINE: HCPCS

## 2021-01-01 PROCEDURE — 93306 TTE W/DOPPLER COMPLETE: CPT | Performed by: INTERNAL MEDICINE

## 2021-01-01 PROCEDURE — 25010000002 PROPOFOL 10 MG/ML EMULSION: Performed by: EMERGENCY MEDICINE

## 2021-01-01 PROCEDURE — 83036 HEMOGLOBIN GLYCOSYLATED A1C: CPT | Performed by: NURSE PRACTITIONER

## 2021-01-01 PROCEDURE — 87040 BLOOD CULTURE FOR BACTERIA: CPT | Performed by: EMERGENCY MEDICINE

## 2021-01-01 PROCEDURE — 25010000002 PROPOFOL 10 MG/ML EMULSION

## 2021-01-01 PROCEDURE — 84484 ASSAY OF TROPONIN QUANT: CPT | Performed by: EMERGENCY MEDICINE

## 2021-01-01 PROCEDURE — 25010000002 CALCIUM GLUCONATE 2-0.675 GM/100ML-% SOLUTION: Performed by: NURSE PRACTITIONER

## 2021-01-01 PROCEDURE — 0BH17EZ INSERTION OF ENDOTRACHEAL AIRWAY INTO TRACHEA, VIA NATURAL OR ARTIFICIAL OPENING: ICD-10-PCS | Performed by: INTERNAL MEDICINE

## 2021-01-01 PROCEDURE — 83735 ASSAY OF MAGNESIUM: CPT | Performed by: NURSE PRACTITIONER

## 2021-01-01 PROCEDURE — 84145 PROCALCITONIN (PCT): CPT | Performed by: NURSE PRACTITIONER

## 2021-01-01 PROCEDURE — 25010000002 MIDAZOLAM PER 1 MG: Performed by: NURSE PRACTITIONER

## 2021-01-01 PROCEDURE — 63710000001 INSULIN LISPRO (HUMAN) PER 5 UNITS: Performed by: NURSE PRACTITIONER

## 2021-01-01 PROCEDURE — 84478 ASSAY OF TRIGLYCERIDES: CPT | Performed by: INTERNAL MEDICINE

## 2021-01-01 PROCEDURE — 25010000002 FENTANYL CITRATE (PF) 100 MCG/2ML SOLUTION

## 2021-01-01 PROCEDURE — 82728 ASSAY OF FERRITIN: CPT | Performed by: NURSE PRACTITIONER

## 2021-01-01 PROCEDURE — 25010000002 CALCIUM GLUCONATE-NACL 1-0.675 GM/50ML-% SOLUTION: Performed by: NURSE PRACTITIONER

## 2021-01-01 RX ORDER — CALCIUM GLUCONATE 20 MG/ML
1 INJECTION, SOLUTION INTRAVENOUS ONCE
Status: COMPLETED | OUTPATIENT
Start: 2021-01-01 | End: 2021-01-01

## 2021-01-01 RX ORDER — CALCIUM GLUCONATE 20 MG/ML
2 INJECTION, SOLUTION INTRAVENOUS AS NEEDED
Status: DISCONTINUED | OUTPATIENT
Start: 2021-01-01 | End: 2021-01-01

## 2021-01-01 RX ORDER — DOXYCYCLINE 100 MG/1
100 TABLET ORAL EVERY 12 HOURS SCHEDULED
Status: DISCONTINUED | OUTPATIENT
Start: 2021-01-01 | End: 2021-01-01 | Stop reason: ALTCHOICE

## 2021-01-01 RX ORDER — POTASSIUM CHLORIDE 7.45 MG/ML
10 INJECTION INTRAVENOUS
Status: DISCONTINUED | OUTPATIENT
Start: 2021-01-01 | End: 2021-01-01

## 2021-01-01 RX ORDER — DOPAMINE HYDROCHLORIDE 160 MG/100ML
2-20 INJECTION, SOLUTION INTRAVENOUS
Status: DISCONTINUED | OUTPATIENT
Start: 2021-01-01 | End: 2021-01-01 | Stop reason: HOSPADM

## 2021-01-01 RX ORDER — FENTANYL CITRATE 50 UG/ML
50 INJECTION, SOLUTION INTRAMUSCULAR; INTRAVENOUS ONCE
Status: COMPLETED | OUTPATIENT
Start: 2021-01-01 | End: 2021-01-01

## 2021-01-01 RX ORDER — DEXTROSE MONOHYDRATE 25 G/50ML
25 INJECTION, SOLUTION INTRAVENOUS
Status: DISCONTINUED | OUTPATIENT
Start: 2021-01-01 | End: 2021-01-01

## 2021-01-01 RX ORDER — PHENYLEPHRINE HCL IN 0.9% NACL 0.5 MG/5ML
.5-3 SYRINGE (ML) INTRAVENOUS
Status: DISCONTINUED | OUTPATIENT
Start: 2021-01-01 | End: 2021-01-01

## 2021-01-01 RX ORDER — WHEY PROTEIN ISOLATE 6 G-25/7 G
2 POWDER (GRAM) ORAL 2 TIMES DAILY
Status: DISCONTINUED | OUTPATIENT
Start: 2021-01-01 | End: 2021-01-01

## 2021-01-01 RX ORDER — ACETAMINOPHEN 650 MG/1
650 SUPPOSITORY RECTAL EVERY 4 HOURS PRN
Status: DISCONTINUED | OUTPATIENT
Start: 2021-01-01 | End: 2021-01-01 | Stop reason: HOSPADM

## 2021-01-01 RX ORDER — MAGNESIUM SULFATE HEPTAHYDRATE 40 MG/ML
2 INJECTION, SOLUTION INTRAVENOUS ONCE
Status: COMPLETED | OUTPATIENT
Start: 2021-01-01 | End: 2021-01-01

## 2021-01-01 RX ORDER — SODIUM CHLORIDE 0.9 % (FLUSH) 0.9 %
3 SYRINGE (ML) INJECTION EVERY 12 HOURS SCHEDULED
Status: DISCONTINUED | OUTPATIENT
Start: 2021-01-01 | End: 2021-01-01 | Stop reason: HOSPADM

## 2021-01-01 RX ORDER — POTASSIUM CHLORIDE 1.5 G/1.77G
40 POWDER, FOR SOLUTION ORAL AS NEEDED
Status: DISCONTINUED | OUTPATIENT
Start: 2021-01-01 | End: 2021-01-01

## 2021-01-01 RX ORDER — EPOPROSTENOL SODIUM 1.5 MG/1
50 INJECTION, POWDER, FOR SOLUTION INTRAVENOUS CONTINUOUS
Status: DISCONTINUED | OUTPATIENT
Start: 2021-01-01 | End: 2021-01-01 | Stop reason: HOSPADM

## 2021-01-01 RX ORDER — HEPARIN SODIUM 10000 [USP'U]/100ML
17.05 INJECTION, SOLUTION INTRAVENOUS
Status: DISCONTINUED | OUTPATIENT
Start: 2021-01-01 | End: 2021-01-01

## 2021-01-01 RX ORDER — ATROPINE SULFATE 10 MG/ML
2 SOLUTION/ DROPS OPHTHALMIC 2 TIMES DAILY PRN
Status: DISCONTINUED | OUTPATIENT
Start: 2021-01-01 | End: 2021-01-01 | Stop reason: HOSPADM

## 2021-01-01 RX ORDER — MAGNESIUM SULFATE HEPTAHYDRATE 40 MG/ML
2 INJECTION, SOLUTION INTRAVENOUS AS NEEDED
Status: DISCONTINUED | OUTPATIENT
Start: 2021-01-01 | End: 2021-01-01

## 2021-01-01 RX ORDER — MIDAZOLAM HYDROCHLORIDE 1 MG/ML
2 INJECTION INTRAMUSCULAR; INTRAVENOUS
Status: DISCONTINUED | OUTPATIENT
Start: 2021-01-01 | End: 2021-01-01

## 2021-01-01 RX ORDER — INSULIN LISPRO 100 [IU]/ML
0-7 INJECTION, SOLUTION INTRAVENOUS; SUBCUTANEOUS EVERY 6 HOURS SCHEDULED
Status: DISCONTINUED | OUTPATIENT
Start: 2021-01-01 | End: 2021-01-01

## 2021-01-01 RX ORDER — CARBOXYMETHYLCELLULOSE SODIUM 10 MG/ML
1 GEL OPHTHALMIC
Status: DISCONTINUED | OUTPATIENT
Start: 2021-01-01 | End: 2021-01-01 | Stop reason: HOSPADM

## 2021-01-01 RX ORDER — NOREPINEPHRINE BIT/0.9 % NACL 8 MG/250ML
.02-.3 INFUSION BOTTLE (ML) INTRAVENOUS
Status: DISCONTINUED | OUTPATIENT
Start: 2021-01-01 | End: 2021-01-01 | Stop reason: HOSPADM

## 2021-01-01 RX ORDER — CALCIUM GLUCONATE 20 MG/ML
1 INJECTION, SOLUTION INTRAVENOUS AS NEEDED
Status: DISCONTINUED | OUTPATIENT
Start: 2021-01-01 | End: 2021-01-01

## 2021-01-01 RX ORDER — LORAZEPAM 2 MG/ML
1 INJECTION INTRAMUSCULAR
Status: DISCONTINUED | OUTPATIENT
Start: 2021-01-01 | End: 2021-01-01 | Stop reason: HOSPADM

## 2021-01-01 RX ORDER — ACETAMINOPHEN 650 MG/1
650 SUPPOSITORY RECTAL EVERY 4 HOURS PRN
Status: DISCONTINUED | OUTPATIENT
Start: 2021-01-01 | End: 2021-01-01

## 2021-01-01 RX ORDER — MORPHINE SULFATE 4 MG/ML
4 INJECTION, SOLUTION INTRAMUSCULAR; INTRAVENOUS
Status: DISCONTINUED | OUTPATIENT
Start: 2021-01-01 | End: 2021-01-01 | Stop reason: HOSPADM

## 2021-01-01 RX ORDER — HEPARIN SODIUM 1000 [USP'U]/ML
INJECTION, SOLUTION INTRAVENOUS; SUBCUTANEOUS AS NEEDED
Status: DISCONTINUED | OUTPATIENT
Start: 2021-01-01 | End: 2021-01-01

## 2021-01-01 RX ORDER — LORAZEPAM 2 MG/ML
2 INJECTION INTRAMUSCULAR
Status: DISCONTINUED | OUTPATIENT
Start: 2021-01-01 | End: 2021-01-01

## 2021-01-01 RX ORDER — INSULIN LISPRO 100 [IU]/ML
0-24 INJECTION, SOLUTION INTRAVENOUS; SUBCUTANEOUS EVERY 6 HOURS SCHEDULED
Status: DISCONTINUED | OUTPATIENT
Start: 2021-01-01 | End: 2021-01-01

## 2021-01-01 RX ORDER — METHYLPREDNISOLONE SODIUM SUCCINATE 125 MG/2ML
60 INJECTION, POWDER, LYOPHILIZED, FOR SOLUTION INTRAMUSCULAR; INTRAVENOUS EVERY 8 HOURS
Status: DISCONTINUED | OUTPATIENT
Start: 2021-01-01 | End: 2021-01-01

## 2021-01-01 RX ORDER — CALCIUM GLUCONATE 20 MG/ML
2 INJECTION, SOLUTION INTRAVENOUS ONCE AS NEEDED
Status: DISCONTINUED | OUTPATIENT
Start: 2021-01-01 | End: 2021-01-01

## 2021-01-01 RX ORDER — ACETAMINOPHEN 160 MG/5ML
650 SOLUTION ORAL EVERY 4 HOURS PRN
Status: DISCONTINUED | OUTPATIENT
Start: 2021-01-01 | End: 2021-01-01 | Stop reason: HOSPADM

## 2021-01-01 RX ORDER — MIDAZOLAM HYDROCHLORIDE 1 MG/ML
1-10 INJECTION, SOLUTION INTRAVENOUS
Status: DISCONTINUED | OUTPATIENT
Start: 2021-01-01 | End: 2021-01-01

## 2021-01-01 RX ORDER — PROPOFOL 10 MG/ML
VIAL (ML) INTRAVENOUS
Status: DISPENSED
Start: 2021-01-01 | End: 2021-01-01

## 2021-01-01 RX ORDER — METHYLPREDNISOLONE SODIUM SUCCINATE 125 MG/2ML
125 INJECTION, POWDER, LYOPHILIZED, FOR SOLUTION INTRAMUSCULAR; INTRAVENOUS ONCE
Status: COMPLETED | OUTPATIENT
Start: 2021-01-01 | End: 2021-01-01

## 2021-01-01 RX ORDER — FAMOTIDINE 10 MG/ML
20 INJECTION, SOLUTION INTRAVENOUS EVERY 12 HOURS SCHEDULED
Status: DISCONTINUED | OUTPATIENT
Start: 2021-01-01 | End: 2021-01-01

## 2021-01-01 RX ORDER — INSULIN LISPRO 100 [IU]/ML
0-7 INJECTION, SOLUTION INTRAVENOUS; SUBCUTANEOUS AS NEEDED
Status: DISCONTINUED | OUTPATIENT
Start: 2021-01-01 | End: 2021-01-01

## 2021-01-01 RX ORDER — ACETAMINOPHEN 325 MG/1
650 TABLET ORAL EVERY 4 HOURS PRN
Status: DISCONTINUED | OUTPATIENT
Start: 2021-01-01 | End: 2021-01-01 | Stop reason: HOSPADM

## 2021-01-01 RX ORDER — POTASSIUM CHLORIDE 20 MEQ/1
40 TABLET, EXTENDED RELEASE ORAL AS NEEDED
Status: DISCONTINUED | OUTPATIENT
Start: 2021-01-01 | End: 2021-01-01

## 2021-01-01 RX ORDER — MIDAZOLAM HYDROCHLORIDE 1 MG/ML
INJECTION INTRAMUSCULAR; INTRAVENOUS
Status: DISPENSED
Start: 2021-01-01 | End: 2021-01-01

## 2021-01-01 RX ORDER — MAGNESIUM SULFATE 1 G/100ML
1 INJECTION INTRAVENOUS AS NEEDED
Status: DISCONTINUED | OUTPATIENT
Start: 2021-01-01 | End: 2021-01-01

## 2021-01-01 RX ORDER — FENTANYL CITRATE-0.9 % NACL/PF 10 MCG/ML
50-200 PLASTIC BAG, INJECTION (ML) INTRAVENOUS
Status: DISCONTINUED | OUTPATIENT
Start: 2021-01-01 | End: 2021-01-01 | Stop reason: HOSPADM

## 2021-01-01 RX ORDER — SODIUM CHLORIDE 0.9 % (FLUSH) 0.9 %
10 SYRINGE (ML) INJECTION AS NEEDED
Status: DISCONTINUED | OUTPATIENT
Start: 2021-01-01 | End: 2021-01-01

## 2021-01-01 RX ORDER — INSULIN LISPRO 100 [IU]/ML
0-24 INJECTION, SOLUTION INTRAVENOUS; SUBCUTANEOUS AS NEEDED
Status: DISCONTINUED | OUTPATIENT
Start: 2021-01-01 | End: 2021-01-01

## 2021-01-01 RX ORDER — POTASSIUM CHLORIDE 29.8 MG/ML
20 INJECTION INTRAVENOUS AS NEEDED
Status: DISCONTINUED | OUTPATIENT
Start: 2021-01-01 | End: 2021-01-01

## 2021-01-01 RX ORDER — ONDANSETRON 2 MG/ML
4 INJECTION INTRAMUSCULAR; INTRAVENOUS EVERY 6 HOURS PRN
Status: DISCONTINUED | OUTPATIENT
Start: 2021-01-01 | End: 2021-01-01

## 2021-01-01 RX ORDER — PROPOFOL 10 MG/ML
VIAL (ML) INTRAVENOUS
Status: COMPLETED
Start: 2021-01-01 | End: 2021-01-01

## 2021-01-01 RX ORDER — MIDAZOLAM HYDROCHLORIDE 1 MG/ML
2 INJECTION INTRAMUSCULAR; INTRAVENOUS ONCE
Status: COMPLETED | OUTPATIENT
Start: 2021-01-01 | End: 2021-01-01

## 2021-01-01 RX ORDER — IPRATROPIUM BROMIDE AND ALBUTEROL SULFATE 2.5; .5 MG/3ML; MG/3ML
3 SOLUTION RESPIRATORY (INHALATION)
Status: DISCONTINUED | OUTPATIENT
Start: 2021-01-01 | End: 2021-01-01

## 2021-01-01 RX ORDER — FENTANYL CITRATE 50 UG/ML
INJECTION, SOLUTION INTRAMUSCULAR; INTRAVENOUS
Status: COMPLETED
Start: 2021-01-01 | End: 2021-01-01

## 2021-01-01 RX ORDER — SCOLOPAMINE TRANSDERMAL SYSTEM 1 MG/1
1 PATCH, EXTENDED RELEASE TRANSDERMAL
Status: DISCONTINUED | OUTPATIENT
Start: 2021-01-01 | End: 2021-01-01 | Stop reason: HOSPADM

## 2021-01-01 RX ORDER — SODIUM CHLORIDE 0.9 % (FLUSH) 0.9 %
3-10 SYRINGE (ML) INJECTION AS NEEDED
Status: DISCONTINUED | OUTPATIENT
Start: 2021-01-01 | End: 2021-01-01

## 2021-01-01 RX ORDER — LORAZEPAM 2 MG/ML
2 INJECTION INTRAMUSCULAR
Status: DISCONTINUED | OUTPATIENT
Start: 2021-01-01 | End: 2021-01-01 | Stop reason: HOSPADM

## 2021-01-01 RX ORDER — CALCIUM GLUCONATE 20 MG/ML
1 INJECTION, SOLUTION INTRAVENOUS ONCE AS NEEDED
Status: DISCONTINUED | OUTPATIENT
Start: 2021-01-01 | End: 2021-01-01

## 2021-01-01 RX ORDER — DIPHENOXYLATE HYDROCHLORIDE AND ATROPINE SULFATE 2.5; .025 MG/1; MG/1
1 TABLET ORAL
Status: DISCONTINUED | OUTPATIENT
Start: 2021-01-01 | End: 2021-01-01 | Stop reason: HOSPADM

## 2021-01-01 RX ORDER — DEXMEDETOMIDINE HYDROCHLORIDE 4 UG/ML
.2-1.5 INJECTION, SOLUTION INTRAVENOUS
Status: DISCONTINUED | OUTPATIENT
Start: 2021-01-01 | End: 2021-01-01

## 2021-01-01 RX ORDER — ACETAMINOPHEN 325 MG/1
650 TABLET ORAL EVERY 4 HOURS PRN
Status: DISCONTINUED | OUTPATIENT
Start: 2021-01-01 | End: 2021-01-01

## 2021-01-01 RX ORDER — BUDESONIDE 0.5 MG/2ML
0.5 INHALANT ORAL
Status: DISCONTINUED | OUTPATIENT
Start: 2021-01-01 | End: 2021-01-01

## 2021-01-01 RX ORDER — FENTANYL CITRATE 50 UG/ML
50 INJECTION, SOLUTION INTRAMUSCULAR; INTRAVENOUS
Status: DISCONTINUED | OUTPATIENT
Start: 2021-01-01 | End: 2021-01-01

## 2021-01-01 RX ORDER — NICOTINE POLACRILEX 4 MG
15 LOZENGE BUCCAL
Status: DISCONTINUED | OUTPATIENT
Start: 2021-01-01 | End: 2021-01-01

## 2021-01-01 RX ADMIN — SODIUM CHLORIDE 3 G: 900 INJECTION INTRAVENOUS at 00:03

## 2021-01-01 RX ADMIN — HEPARIN SODIUM 14.77 UNITS/KG/HR: 10000 INJECTION, SOLUTION INTRAVENOUS at 04:15

## 2021-01-01 RX ADMIN — WATER 50 NG/KG/MIN: 1 SOLUTION INTRAVENOUS at 14:56

## 2021-01-01 RX ADMIN — METHYLPREDNISOLONE SODIUM SUCCINATE 60 MG: 125 INJECTION, POWDER, FOR SOLUTION INTRAMUSCULAR; INTRAVENOUS at 00:38

## 2021-01-01 RX ADMIN — INSULIN LISPRO 12 UNITS: 100 INJECTION, SOLUTION INTRAVENOUS; SUBCUTANEOUS at 12:30

## 2021-01-01 RX ADMIN — WATER 50 NG/KG/MIN: 1 SOLUTION INTRAVENOUS at 14:19

## 2021-01-01 RX ADMIN — MIDAZOLAM HYDROCHLORIDE 2 MG: 1 INJECTION, SOLUTION INTRAMUSCULAR; INTRAVENOUS at 04:24

## 2021-01-01 RX ADMIN — HEPARIN SODIUM 17.05 UNITS/KG/HR: 10000 INJECTION, SOLUTION INTRAVENOUS at 03:24

## 2021-01-01 RX ADMIN — SODIUM CHLORIDE 500 MG: 900 INJECTION, SOLUTION INTRAVENOUS at 01:53

## 2021-01-01 RX ADMIN — FENTANYL CITRATE 50 MCG: 50 INJECTION, SOLUTION INTRAMUSCULAR; INTRAVENOUS at 04:00

## 2021-01-01 RX ADMIN — METHYLPREDNISOLONE SODIUM SUCCINATE 125 MG: 125 INJECTION, POWDER, FOR SOLUTION INTRAMUSCULAR; INTRAVENOUS at 01:37

## 2021-01-01 RX ADMIN — Medication 0.05 MCG/KG/MIN: at 22:07

## 2021-01-01 RX ADMIN — DEXMEDETOMIDINE HYDROCHLORIDE 1.5 MCG/KG/HR: 100 INJECTION, SOLUTION INTRAVENOUS at 17:30

## 2021-01-01 RX ADMIN — DOPAMINE HYDROCHLORIDE 10 MCG/KG/MIN: 160 INJECTION, SOLUTION INTRAVENOUS at 05:45

## 2021-01-01 RX ADMIN — CEFTRIAXONE SODIUM 2 G: 2 INJECTION, POWDER, FOR SOLUTION INTRAMUSCULAR; INTRAVENOUS at 13:17

## 2021-01-01 RX ADMIN — SCOPALAMINE 1 PATCH: 1 PATCH, EXTENDED RELEASE TRANSDERMAL at 08:49

## 2021-01-01 RX ADMIN — PHENYLEPHRINE HYDROCHLORIDE 3 MCG/KG/MIN: 10 INJECTION INTRAVENOUS at 21:16

## 2021-01-01 RX ADMIN — IPRATROPIUM BROMIDE AND ALBUTEROL SULFATE 3 ML: 2.5; .5 SOLUTION RESPIRATORY (INHALATION) at 02:42

## 2021-01-01 RX ADMIN — FENTANYL CITRATE 50 MCG/HR: 0.05 INJECTION, SOLUTION INTRAMUSCULAR; INTRAVENOUS at 17:04

## 2021-01-01 RX ADMIN — SODIUM BICARBONATE: 84 INJECTION, SOLUTION INTRAVENOUS at 14:49

## 2021-01-01 RX ADMIN — SODIUM BICARBONATE: 84 INJECTION, SOLUTION INTRAVENOUS at 19:55

## 2021-01-01 RX ADMIN — POTASSIUM CHLORIDE 10 MEQ: 7.46 INJECTION, SOLUTION INTRAVENOUS at 06:22

## 2021-01-01 RX ADMIN — LORAZEPAM 2 MG: 2 INJECTION INTRAMUSCULAR; INTRAVENOUS at 08:49

## 2021-01-01 RX ADMIN — POTASSIUM CHLORIDE 10 MEQ: 7.46 INJECTION, SOLUTION INTRAVENOUS at 04:56

## 2021-01-01 RX ADMIN — INSULIN LISPRO 12 UNITS: 100 INJECTION, SOLUTION INTRAVENOUS; SUBCUTANEOUS at 18:41

## 2021-01-01 RX ADMIN — WATER 50 NG/KG/MIN: 1 SOLUTION INTRAVENOUS at 21:00

## 2021-01-01 RX ADMIN — VASOPRESSIN 0.03 UNITS/MIN: 20 INJECTION INTRAVENOUS at 14:04

## 2021-01-01 RX ADMIN — Medication 0.5 MCG/KG/MIN: at 06:46

## 2021-01-01 RX ADMIN — SODIUM BICARBONATE: 84 INJECTION, SOLUTION INTRAVENOUS at 06:44

## 2021-01-01 RX ADMIN — BUDESONIDE 0.5 MG: 0.5 SUSPENSION RESPIRATORY (INHALATION) at 06:31

## 2021-01-01 RX ADMIN — FAMOTIDINE 20 MG: 10 INJECTION INTRAVENOUS at 21:38

## 2021-01-01 RX ADMIN — PROPOFOL 10 MCG/KG/MIN: 10 INJECTION, EMULSION INTRAVENOUS at 12:23

## 2021-01-01 RX ADMIN — DOPAMINE HYDROCHLORIDE 2 MCG/KG/MIN: 160 INJECTION, SOLUTION INTRAVENOUS at 06:17

## 2021-01-01 RX ADMIN — MORPHINE SULFATE 4 MG: 4 INJECTION INTRAVENOUS at 08:49

## 2021-01-01 RX ADMIN — FENTANYL CITRATE 50 MCG: 50 INJECTION, SOLUTION INTRAMUSCULAR; INTRAVENOUS at 03:12

## 2021-01-01 RX ADMIN — PHENYLEPHRINE HYDROCHLORIDE 1.5 MCG/KG/MIN: 10 INJECTION INTRAVENOUS at 17:07

## 2021-01-01 RX ADMIN — WATER 50 NG/KG/MIN: 1 SOLUTION INTRAVENOUS at 07:42

## 2021-01-01 RX ADMIN — IPRATROPIUM BROMIDE AND ALBUTEROL SULFATE 3 ML: 2.5; .5 SOLUTION RESPIRATORY (INHALATION) at 23:12

## 2021-01-01 RX ADMIN — INSULIN LISPRO 4 UNITS: 100 INJECTION, SOLUTION INTRAVENOUS; SUBCUTANEOUS at 05:25

## 2021-01-01 RX ADMIN — IPRATROPIUM BROMIDE AND ALBUTEROL SULFATE 3 ML: 2.5; .5 SOLUTION RESPIRATORY (INHALATION) at 15:11

## 2021-01-01 RX ADMIN — CALCIUM CHLORIDE, MAGNESIUM CHLORIDE, SODIUM CHLORIDE, SODIUM BICARBONATE, POTASSIUM CHLORIDE AND SODIUM PHOSPHATE DIBASIC DIHYDRATE 1000 ML/HR: 3.68; 3.05; 6.34; 3.09; .314; .187 INJECTION INTRAVENOUS at 21:10

## 2021-01-01 RX ADMIN — PROPOFOL 10 MCG/KG/MIN: 10 INJECTION, EMULSION INTRAVENOUS at 00:39

## 2021-01-01 RX ADMIN — INSULIN LISPRO 4 UNITS: 100 INJECTION, SOLUTION INTRAVENOUS; SUBCUTANEOUS at 01:48

## 2021-01-01 RX ADMIN — IPRATROPIUM BROMIDE AND ALBUTEROL SULFATE 3 ML: 2.5; .5 SOLUTION RESPIRATORY (INHALATION) at 06:31

## 2021-01-01 RX ADMIN — POTASSIUM CHLORIDE 10 MEQ: 7.46 INJECTION, SOLUTION INTRAVENOUS at 09:12

## 2021-01-01 RX ADMIN — VASOPRESSIN 0.03 UNITS/MIN: 20 INJECTION INTRAVENOUS at 21:17

## 2021-01-01 RX ADMIN — PHENYLEPHRINE HYDROCHLORIDE 6 MCG/KG/MIN: 10 INJECTION INTRAVENOUS at 05:25

## 2021-01-01 RX ADMIN — WATER 50 NG/KG/MIN: 1 SOLUTION INTRAVENOUS at 04:33

## 2021-01-01 RX ADMIN — Medication 3 ML: at 09:34

## 2021-01-01 RX ADMIN — FAMOTIDINE 20 MG: 10 INJECTION INTRAVENOUS at 08:09

## 2021-01-01 RX ADMIN — FENTANYL CITRATE 200 MCG/HR: 0.05 INJECTION, SOLUTION INTRAMUSCULAR; INTRAVENOUS at 22:08

## 2021-01-01 RX ADMIN — FAMOTIDINE 20 MG: 10 INJECTION INTRAVENOUS at 20:51

## 2021-01-01 RX ADMIN — SODIUM CHLORIDE 1000 ML: 9 INJECTION, SOLUTION INTRAVENOUS at 12:51

## 2021-01-01 RX ADMIN — SODIUM CHLORIDE 3 G: 900 INJECTION INTRAVENOUS at 06:47

## 2021-01-01 RX ADMIN — HYDROCORTISONE SODIUM SUCCINATE 50 MG: 100 INJECTION, POWDER, FOR SOLUTION INTRAMUSCULAR; INTRAVENOUS at 15:34

## 2021-01-01 RX ADMIN — DOXYCYCLINE 100 MG: 100 INJECTION, POWDER, LYOPHILIZED, FOR SOLUTION INTRAVENOUS at 09:58

## 2021-01-01 RX ADMIN — METHYLPREDNISOLONE SODIUM SUCCINATE 60 MG: 125 INJECTION, POWDER, FOR SOLUTION INTRAMUSCULAR; INTRAVENOUS at 16:13

## 2021-01-01 RX ADMIN — SODIUM BICARBONATE: 84 INJECTION, SOLUTION INTRAVENOUS at 04:27

## 2021-01-01 RX ADMIN — METHYLPREDNISOLONE SODIUM SUCCINATE 60 MG: 125 INJECTION, POWDER, FOR SOLUTION INTRAMUSCULAR; INTRAVENOUS at 01:45

## 2021-01-01 RX ADMIN — Medication 3 ML: at 20:52

## 2021-01-01 RX ADMIN — METHYLPREDNISOLONE SODIUM SUCCINATE 60 MG: 125 INJECTION, POWDER, FOR SOLUTION INTRAMUSCULAR; INTRAVENOUS at 09:30

## 2021-01-01 RX ADMIN — CALCIUM GLUCONATE 2 G: 20 INJECTION, SOLUTION INTRAVENOUS at 06:00

## 2021-01-01 RX ADMIN — BUDESONIDE 0.5 MG: 0.5 SUSPENSION RESPIRATORY (INHALATION) at 07:42

## 2021-01-01 RX ADMIN — PHENYLEPHRINE HYDROCHLORIDE 6 MCG/KG/MIN: 10 INJECTION INTRAVENOUS at 01:24

## 2021-01-01 RX ADMIN — Medication 0.5 MCG/KG/MIN: at 01:22

## 2021-01-01 RX ADMIN — DEXMEDETOMIDINE HYDROCHLORIDE 0.2 MCG/KG/HR: 100 INJECTION, SOLUTION INTRAVENOUS at 09:29

## 2021-01-01 RX ADMIN — SODIUM BICARBONATE: 84 INJECTION, SOLUTION INTRAVENOUS at 21:49

## 2021-01-01 RX ADMIN — IPRATROPIUM BROMIDE AND ALBUTEROL SULFATE 3 ML: 2.5; .5 SOLUTION RESPIRATORY (INHALATION) at 18:36

## 2021-01-01 RX ADMIN — MIDAZOLAM 2 MG: 1 INJECTION INTRAMUSCULAR; INTRAVENOUS at 03:12

## 2021-01-01 RX ADMIN — SODIUM BICARBONATE: 84 INJECTION, SOLUTION INTRAVENOUS at 16:13

## 2021-01-01 RX ADMIN — POTASSIUM CHLORIDE 10 MEQ: 7.46 INJECTION, SOLUTION INTRAVENOUS at 09:29

## 2021-01-01 RX ADMIN — SODIUM CHLORIDE 2640 ML: 9 INJECTION, SOLUTION INTRAVENOUS at 02:05

## 2021-01-01 RX ADMIN — WATER 50 NG/KG/MIN: 1 SOLUTION INTRAVENOUS at 18:49

## 2021-01-01 RX ADMIN — MAGNESIUM SULFATE HEPTAHYDRATE 2 G: 40 INJECTION, SOLUTION INTRAVENOUS at 04:55

## 2021-01-01 RX ADMIN — PHENYLEPHRINE HYDROCHLORIDE 2 MCG/KG/MIN: 10 INJECTION INTRAVENOUS at 20:16

## 2021-01-01 RX ADMIN — PHENYLEPHRINE HYDROCHLORIDE 0.5 MCG/KG/MIN: 10 INJECTION INTRAVENOUS at 08:59

## 2021-01-01 RX ADMIN — IPRATROPIUM BROMIDE AND ALBUTEROL SULFATE 3 ML: 2.5; .5 SOLUTION RESPIRATORY (INHALATION) at 07:42

## 2021-01-01 RX ADMIN — BUDESONIDE 0.5 MG: 0.5 SUSPENSION RESPIRATORY (INHALATION) at 05:58

## 2021-01-01 RX ADMIN — HYDROCORTISONE SODIUM SUCCINATE 50 MG: 100 INJECTION, POWDER, FOR SOLUTION INTRAMUSCULAR; INTRAVENOUS at 06:47

## 2021-01-01 RX ADMIN — POTASSIUM CHLORIDE 10 MEQ: 7.46 INJECTION, SOLUTION INTRAVENOUS at 03:23

## 2021-01-01 RX ADMIN — IPRATROPIUM BROMIDE AND ALBUTEROL SULFATE 3 ML: 2.5; .5 SOLUTION RESPIRATORY (INHALATION) at 10:52

## 2021-01-01 RX ADMIN — FAMOTIDINE 20 MG: 10 INJECTION INTRAVENOUS at 09:33

## 2021-01-01 RX ADMIN — INSULIN LISPRO 3 UNITS: 100 INJECTION, SOLUTION INTRAVENOUS; SUBCUTANEOUS at 06:47

## 2021-01-01 RX ADMIN — PHENYLEPHRINE HYDROCHLORIDE 3 MCG/KG/MIN: 10 INJECTION INTRAVENOUS at 14:04

## 2021-01-01 RX ADMIN — CALCIUM GLUCONATE 1 G: 20 INJECTION, SOLUTION INTRAVENOUS at 04:55

## 2021-01-01 RX ADMIN — PROPOFOL 30 MCG/KG/MIN: 10 INJECTION, EMULSION INTRAVENOUS at 06:05

## 2021-01-01 RX ADMIN — DOXYCYCLINE 100 MG: 100 INJECTION, POWDER, LYOPHILIZED, FOR SOLUTION INTRAVENOUS at 21:39

## 2021-01-01 RX ADMIN — BUDESONIDE 0.5 MG: 0.5 SUSPENSION RESPIRATORY (INHALATION) at 18:56

## 2021-01-01 RX ADMIN — VASOPRESSIN 0.03 UNITS/MIN: 20 INJECTION INTRAVENOUS at 01:00

## 2021-01-01 RX ADMIN — PROPOFOL 15 MCG/KG/MIN: 10 INJECTION, EMULSION INTRAVENOUS at 02:26

## 2021-01-01 RX ADMIN — IPRATROPIUM BROMIDE AND ALBUTEROL SULFATE 3 ML: 2.5; .5 SOLUTION RESPIRATORY (INHALATION) at 11:59

## 2021-01-01 RX ADMIN — SODIUM CHLORIDE 3 G: 900 INJECTION INTRAVENOUS at 15:33

## 2021-01-01 RX ADMIN — PROPOFOL 20 MCG/KG/MIN: 10 INJECTION, EMULSION INTRAVENOUS at 02:12

## 2021-01-01 RX ADMIN — DOPAMINE HYDROCHLORIDE 20 MCG/KG/MIN: 160 INJECTION, SOLUTION INTRAVENOUS at 22:25

## 2021-01-01 RX ADMIN — Medication 3 ML: at 21:17

## 2021-01-01 RX ADMIN — MIDAZOLAM HYDROCHLORIDE 2 MG/HR: 5 INJECTION, SOLUTION INTRAMUSCULAR; INTRAVENOUS at 10:04

## 2021-01-01 RX ADMIN — INSULIN LISPRO 4 UNITS: 100 INJECTION, SOLUTION INTRAVENOUS; SUBCUTANEOUS at 00:38

## 2021-01-01 RX ADMIN — IPRATROPIUM BROMIDE AND ALBUTEROL SULFATE 3 ML: 2.5; .5 SOLUTION RESPIRATORY (INHALATION) at 14:56

## 2021-01-01 RX ADMIN — CEFTRIAXONE SODIUM 1 G: 10 INJECTION, POWDER, FOR SOLUTION INTRAVENOUS at 01:53

## 2021-01-01 RX ADMIN — METHYLPREDNISOLONE SODIUM SUCCINATE 60 MG: 125 INJECTION, POWDER, FOR SOLUTION INTRAMUSCULAR; INTRAVENOUS at 08:09

## 2021-01-01 RX ADMIN — IOPAMIDOL 100 ML: 755 INJECTION, SOLUTION INTRAVENOUS at 04:06

## 2021-01-01 RX ADMIN — PHENYLEPHRINE HYDROCHLORIDE 1.35 MCG/KG/MIN: 10 INJECTION INTRAVENOUS at 08:10

## 2021-01-01 RX ADMIN — IPRATROPIUM BROMIDE AND ALBUTEROL SULFATE 3 ML: 2.5; .5 SOLUTION RESPIRATORY (INHALATION) at 03:55

## 2021-01-01 RX ADMIN — POTASSIUM CHLORIDE 10 MEQ: 7.46 INJECTION, SOLUTION INTRAVENOUS at 07:21

## 2021-01-01 RX ADMIN — DEXMEDETOMIDINE HYDROCHLORIDE 1.5 MCG/KG/HR: 100 INJECTION, SOLUTION INTRAVENOUS at 14:44

## 2021-01-01 RX ADMIN — BUDESONIDE 0.5 MG: 0.5 SUSPENSION RESPIRATORY (INHALATION) at 18:36

## 2021-01-01 RX ADMIN — Medication 3 ML: at 08:10

## 2021-01-01 RX ADMIN — GLYCOPYRROLATE 0.4 MG: 0.2 INJECTION, SOLUTION INTRAMUSCULAR; INTRAVITREAL at 08:49

## 2021-01-01 RX ADMIN — SODIUM BICARBONATE: 84 INJECTION, SOLUTION INTRAVENOUS at 00:03

## 2021-01-01 RX ADMIN — WATER 50 NG/KG/MIN: 1 SOLUTION INTRAVENOUS at 09:51

## 2021-01-01 RX ADMIN — FENTANYL CITRATE 50 MCG: 50 INJECTION, SOLUTION INTRAMUSCULAR; INTRAVENOUS at 14:24

## 2021-01-01 RX ADMIN — VASOPRESSIN 0.03 UNITS/MIN: 20 INJECTION INTRAVENOUS at 13:15

## 2021-01-01 RX ADMIN — HYDROCORTISONE SODIUM SUCCINATE 50 MG: 100 INJECTION, POWDER, FOR SOLUTION INTRAMUSCULAR; INTRAVENOUS at 23:04

## 2021-01-01 RX ADMIN — IPRATROPIUM BROMIDE AND ALBUTEROL SULFATE 3 ML: 2.5; .5 SOLUTION RESPIRATORY (INHALATION) at 05:58

## 2021-01-01 RX ADMIN — MIDAZOLAM HYDROCHLORIDE 2 MG/HR: 5 INJECTION, SOLUTION INTRAMUSCULAR; INTRAVENOUS at 01:00

## 2021-01-01 RX ADMIN — WATER 50 NG/KG/MIN: 1 SOLUTION INTRAVENOUS at 08:51

## 2021-01-01 RX ADMIN — WATER 50 NG/KG/MIN: 1 SOLUTION INTRAVENOUS at 02:26

## 2021-01-01 RX ADMIN — METHYLPREDNISOLONE SODIUM SUCCINATE 60 MG: 125 INJECTION, POWDER, FOR SOLUTION INTRAMUSCULAR; INTRAVENOUS at 17:08

## 2021-01-01 RX ADMIN — IPRATROPIUM BROMIDE AND ALBUTEROL SULFATE 3 ML: 2.5; .5 SOLUTION RESPIRATORY (INHALATION) at 18:50

## 2021-01-31 PROBLEM — N39.0 UTI (URINARY TRACT INFECTION): Status: ACTIVE | Noted: 2021-01-01

## 2021-01-31 PROBLEM — R65.21 SEPTIC SHOCK (HCC): Status: ACTIVE | Noted: 2021-01-01

## 2021-01-31 PROBLEM — E87.6 HYPOKALEMIA: Status: ACTIVE | Noted: 2021-01-01

## 2021-01-31 PROBLEM — R77.8 ELEVATED TROPONIN: Status: ACTIVE | Noted: 2021-01-01

## 2021-01-31 PROBLEM — E83.42 HYPOMAGNESEMIA: Status: ACTIVE | Noted: 2021-01-01

## 2021-01-31 PROBLEM — A41.9 SEPTIC SHOCK (HCC): Status: ACTIVE | Noted: 2021-01-01

## 2021-01-31 PROBLEM — J96.02 ACUTE RESPIRATORY FAILURE WITH HYPERCAPNIA (HCC): Status: ACTIVE | Noted: 2021-01-01

## 2021-01-31 PROBLEM — R79.89 ELEVATED BRAIN NATRIURETIC PEPTIDE (BNP) LEVEL: Status: ACTIVE | Noted: 2021-01-01

## 2021-01-31 PROBLEM — J18.9 PNA (PNEUMONIA): Status: ACTIVE | Noted: 2021-01-01

## 2021-01-31 PROBLEM — R79.89 ELEVATED D-DIMER: Status: ACTIVE | Noted: 2021-01-01

## 2021-01-31 PROBLEM — R73.9 HYPERGLYCEMIA: Status: ACTIVE | Noted: 2021-01-01

## 2021-01-31 PROBLEM — E83.51 HYPOCALCEMIA: Status: ACTIVE | Noted: 2021-01-01

## 2021-02-01 NOTE — PROGRESS NOTES
PULMONARY/CRITICAL CARE PROGRESS NOTE         Name:  Pillo Hensley  Age: 70 y.o.  Sex:  male  :   1950  MRN:  [id number]     ROOM:  [facility] ICU 2303/1     ASSESSMENT & PLAN     F/U: Acute respiratory failure    Active Problems:    Acute respiratory failure with hypercapnia (CMS/HCC)    Septic shock (CMS/HCC)    Hypokalemia    Hypocalcemia    Hypomagnesemia    Elevated d-dimer    Elevated troponin    Elevated brain natriuretic peptide (BNP) level    Hyperglycemia    PNA (pneumonia)    UTI (urinary tract infection)       Multidisciplinary Rounds:      Assessment and plan:  Acute respiratory failure with hypercapnia and hypoxia (CMS/HCC)  -Intubated in the ED  -vent settings and ABGs reviewed.  Cont to adjust vent as appropriate   -wean FIO2 for sats 92%  -pulmicort and duonebs   -CXR and CT PE study report reviewed, severe emphysema, no PE   -Continue Flolan inhaled     PNA, community-acquired pneumonia with Streptococcus pneumoniae  -cont with abx of Rocephin and Doxy   -pulmicort and duonebs   -sputum cx ordered  -urine antigen for Legionella negative  -Urine antigen for Streptococcus pneumoniae positive     Septic shock, etiology pneumonia  -s/p IV fluids of 30 ml/kg   -lactate 8.8 then 6.5, cont to monitor Q 4 hours until normal  -check procal and sputum cx  -UA reviewed and did not reflex  -blood cx pending  -Initially received empiric abx given of Rocephin and Azithromycin in ED.  Cont with Rocephin and Doxy   -Consult ID for antibiotic management  -Now on ceftriaxone monotherapy     ARVIND  -Likely ATN secondary to septic shock  -Consult nephrology    Elevated d-dimer  -D-Dimer 9.51, CT PE study negative for PE, but did reflect bilateral PNA, severe emphysema     Elevated troponin/NSTEMI  -troponin 1.030, cont to monitor Q 6 hours   -cardiology consulted  -ECHO reviewed, EF 61 to 65%, severe mitral valve regurgitation, RV cavity mildly dilated  -lipid panel ordered  -Heparin drip started       Elevated brain natriuretic peptide (BNP) level  -pro BNP 2,102  -ECHO reviewed as above      Hyperglycemia  -SSI  -Hgb A1c 5.7     UTI  -abx as above   -follow urine cx     Tobacco abuse, current every day smoker on admission up to 3 packs/day  Medical noncompliance    Electrolyte replacement as needed     PICC  F/C  NGT - consult dietician for TF recs     Code Status: Full  VTE Prophylaxis: SCDs, Heparin gtt   PUD Prophylaxis: Pepcid          [place] & SUBJECTIVE     Pillo MARIA L Hensley is a 70 y.o. male who presented to the ED by way of EMS on 1/31/2021 with complaint of respiratory distress.  EMS used bag valve mask in route to the ED and upon arrival he was intubated.  Sepsis protocol was initiated and he was given an IV fluid bolus of 30 ml/kg.  Blood cultures sent.  Antibiotics administered of Rocephin and Azithromycin.   Respiratory viral panel with COVID - 19 was negative.  EKG showed some possible ST elevation inferiorly and cardiology was called who reviewed the EKG, but the cardiac catheterization lab was not activated at this time.  He was started on a Heparin drip.  CXR showed increased interstitial markings and chronic findings suggestive of COPD/Emphysema.  CT PE study was negative for PE, but did reflect bilateral PNA, severe emphysema.  In the ED, labs were obtained with abnormalities as follows: troponin 1.030, pro BNP 2,102, , Glucose 184, , K+ 2.4, , CO2 17, Chloride 117, Calcium 4.9, Ionized calcium 1.15, total protein 3.4, ALT 46, AST 60, Albumin 1.80, CRP 1.10, lactate 8.8, magnesium 1.1, and D-Dimer 9.51.  Urinalysis was dark and cloudy with positive glucose, blood, leukocytes, and protein.  Urine cx pending.  Pulmonary/Intensivist service was contacted for admission to ICU and further evaluation and treatment.       Information for this HPI is limited as the patient is intubated and sedated.  Information obtained by chart review and ED provider.    REVIEW OF SYSTEMS:  Unable to  assess due to pt condition    Daily progression:  2/1: No acute events overnight.  Intubated and sedated.  Continues on inhaled Flolan.  Multiple pressor support with tash and vaso-.  On a bicarb drip and heparin drip    MEDICATIONS     SCHEDULED MEDICATIONS:   ampicillin-sulbactam, 3 g, Intravenous, Q8H  Beneprotein, 2 packet, Nasogastric, BID  budesonide, 0.5 mg, Nebulization, BID - RT  doxycycline, 100 mg, Intravenous, Q12H  famotidine, 20 mg, Intravenous, Q12H  hydrocortisone sodium succinate, 50 mg, Intravenous, Q8H  insulin lispro, 0-7 Units, Subcutaneous, Q6H  ipratropium-albuterol, 3 mL, Nebulization, Q4H - RT  methylPREDNISolone sodium succinate, 60 mg, Intravenous, Q8H  sodium chloride, 3 mL, Intravenous, Q12H        CONTINUOUS INFUSIONS:   dexmedetomidine, 0.2-1.5 mcg/kg/hr, Last Rate: Stopped (01/31/21 2250)  custom IV infusion builder, , Last Rate: 250 mL/hr at 02/01/21 0427  DOPamine, 2-20 mcg/kg/min, Last Rate: Stopped (01/31/21 0730)  epoprostenol, 50 ng/kg/min (Ideal), Last Rate: 50 ng/kg/min (02/01/21 0433)  fentanyl 10 mcg/mL,  mcg/hr, Last Rate: 25 mcg/hr (01/31/21 2004)  heparin, 17.05 Units/kg/hr, Last Rate: Stopped (02/01/21 0240)  norepinephrine, 0.02-0.3 mcg/kg/min, Last Rate: Stopped (01/31/21 2022)  phenylephrine, 0.5-3 mcg/kg/min, Last Rate: 1.35 mcg/kg/min (02/01/21 0810)  propofol, 5-50 mcg/kg/min, Last Rate: 5 mcg/kg/min (02/01/21 0500)  vasopressin, 0.03 Units/min, Last Rate: 0.03 Units/min (01/31/21 2117)        PRN MEDS:  •  acetaminophen **OR** acetaminophen  •  Calcium Gluconate-NaCl **AND** calcium gluconate **AND** Calcium, Ionized  •  dextrose  •  dextrose  •  fentaNYL citrate (PF)  •  glucagon (human recombinant)  •  heparin  •  heparin  •  insulin lispro **AND** insulin lispro  •  LORazepam  •  magnesium sulfate **OR** magnesium sulfate in D5W 1g/100mL (PREMIX)  •  midazolam  •  ondansetron  •  potassium chloride **OR** potassium chloride **OR** potassium chloride  •   "potassium chloride  •  potassium phosphate infusion greater than 15 mMoles **OR** potassium phosphate infusion greater than 15 mMoles **OR** potassium phosphate **OR** sodium phosphate IVPB **OR** sodium phosphate IVPB **OR** sodium phosphate IVPB  •  sodium chloride  •  sodium chloride    OBJECTIVE     VITAL SIGNS:  /55   Pulse 94   Temp 97.2 °F (36.2 °C) (Bladder)   Resp 28   Ht 170.2 cm (67\")   Wt 84.2 kg (185 lb 10 oz)   SpO2 95%   BMI 29.07 kg/m²     I/O FROM PREVIOUS 3 SHIFTS:  I/O last 3 completed shifts:  In: 6771 [I.V.:6771]  Out: 765 [Urine:540; Emesis/NG output:225]    I/O THIS SHIFT:  No intake/output data recorded.    PHYSICAL EXAM:  General Appearance:  Intubated and sedated. NAD.  Appears acutely and chronically ill  Head:  Normocephalic, without obvious abnormality, atraumatic. OETT.  NG tube  Eyes:  Pupils equal, conjunctiva/corneas clear, EOM's cannot be assessed    Neck:  No JVD, trachea midline  Lungs:   Coarse throughout, scattered rhonchi, mild wheezing, mechanically ventilated   Chest wall:  Symmetrical movement  Heart:  Regular rate and rhythm, S1 and S2 normal, + ASIF, rub or gallop  Abdomen: Distended, hypoactive bowel sounds, firm  Extremities:  Extremities normal, no cyanosis or edema  Pulses: 2+ and symmetric all extremities  Skin:  No rashes or lesions  Neurologic:   Alert and oriented, no focal deficits      RESULTS     LABS:  Lab Results (last 24 hours)     Procedure Component Value Units Date/Time    POC Glucose Once [[id number]]  (Abnormal) Collected: 02/01/21 0643    Specimen: Blood Updated: 02/01/21 0644     Glucose 220 mg/dL      Comment: Serial Number: [id number]:  [id number]       Basic Metabolic Panel [[id number]]  (Abnormal) Collected: 02/01/21 0432    Specimen: Blood Updated: 02/01/21 0533     Glucose 319 mg/dL      BUN 31 mg/dL      Comment: Result checked         Creatinine 2.69 mg/dL      Sodium 137 mmol/L      Potassium 4.4 mmol/L      Comment: Result " checked         Chloride 98 mmol/L      CO2 16.0 mmol/L      Calcium 6.6 mg/dL      eGFR Non African Amer 24 mL/min/1.73      BUN/Creatinine Ratio 11.5     Anion Gap 23.0 mmol/L     Narrative:      GFR Normal >60  Chronic Kidney Disease <60  Kidney Failure <15      Magnesium [[id number]]  (Normal) Collected: 02/01/21 0432    Specimen: Blood Updated: 02/01/21 0533     Magnesium 2.3 mg/dL     Phosphorus [[id number]]  (Abnormal) Collected: 02/01/21 0432    Specimen: Blood Updated: 02/01/21 0533     Phosphorus 8.0 mg/dL     Triglycerides [[id number]]  (Normal) Collected: 02/01/21 0432    Specimen: Blood Updated: 02/01/21 0516     Triglycerides 97 mg/dL     aPTT [[id number]]  (Abnormal) Collected: 02/01/21 0432    Specimen: Blood Updated: 02/01/21 0457     PTT 54.7 seconds     CBC & Differential [[id number]]  (Abnormal) Collected: 02/01/21 0432    Specimen: Blood Updated: 02/01/21 0450    Narrative:      The following orders were created for panel order CBC & Differential.  Procedure                               Abnormality         Status                     ---------                               -----------         ------                     CBC Auto Differential[[id number]]        Abnormal            Final result                 Please view results for these tests on the individual orders.    CBC Auto Differential [[id number]]  (Abnormal) Collected: 02/01/21 0432    Specimen: Blood Updated: 02/01/21 0450     WBC 17.30 10*3/mm3      Comment: Result checked         RBC 4.69 10*6/mm3      Hemoglobin 13.9 g/dL      Hematocrit 43.4 %      MCV 92.6 fL      MCH 29.7 pg      MCHC 32.0 g/dL      RDW 16.0 %      RDW-SD 52.9 fl      MPV 7.8 fL      Platelets 163 10*3/mm3      Neutrophil % 94.2 %      Lymphocyte % 3.5 %      Monocyte % 2.2 %      Eosinophil % 0.0 %      Basophil % 0.1 %      Neutrophils, Absolute 16.30 10*3/mm3      Lymphocytes, Absolute 0.60 10*3/mm3      Monocytes, Absolute 0.40 10*3/mm3      Eosinophils, Absolute  0.00 10*3/mm3      Basophils, Absolute 0.00 10*3/mm3      nRBC 0.1 /100 WBC     Blood Gas, Arterial - [[id number]]  (Abnormal) Collected: 02/01/21 0309    Specimen: Arterial Blood Updated: 02/01/21 0322     Site Arterial Line     Nilson's Test Positive     pH, Arterial 7.131 pH units      pCO2, Arterial 46.8 mm Hg      pO2, Arterial 61.1 mm Hg      HCO3, Arterial 15.6 mmol/L      Base Excess, Arterial -13.5 mmol/L      Comment: Serial Number: [id number]:  [id number]        O2 Saturation, Arterial 82.3 %      CO2 Content 17.0 mmol/L      Barometric Pressure for Blood Gas --     Comment: N/A        Modality Adult Vent     FIO2 100 %      Ventilator Mode ;AC     Set Tidal Volume 550     PEEP 14     Hemodilution No     Respiratory Rate 28    aPTT [[id number]]  (Abnormal) Collected: 02/01/21 0233    Specimen: Blood Updated: 02/01/21 0313     .2 seconds     Blood Culture - Blood, Arm, Right [[id number]] Collected: 01/31/21 0141    Specimen: Blood from Arm, Right Updated: 02/01/21 0200     Blood Culture No growth at 24 hours    Blood Culture - Blood, Arm, Left [[id number]] Collected: 01/31/21 0141    Specimen: Blood from Arm, Left Updated: 02/01/21 0200     Blood Culture No growth at 24 hours    Blood Gas, Arterial - [[id number]]  (Abnormal) Collected: 02/01/21 0022    Specimen: Arterial Blood Updated: 02/01/21 0029     Site Arterial Line     Nilson's Test N/A     pH, Arterial 7.159 pH units      pCO2, Arterial 43.2 mm Hg      pO2, Arterial 102.4 mm Hg      HCO3, Arterial 15.4 mmol/L      Base Excess, Arterial -13.0 mmol/L      Comment: Serial Number: [id number]:  [id number]        O2 Saturation, Arterial 95.8 %      CO2 Content 16.7 mmol/L      Barometric Pressure for Blood Gas --     Comment: N/A        Modality Aerosol Mask     FIO2 100 %      Ventilator Mode ;AC     Set Tidal Volume 550     PEEP 14     Hemodilution No     Respiratory Rate 28    POC Glucose Once [[id number]]  (Abnormal) Collected: 02/01/21 0022     Specimen: Blood Updated: 02/01/21 0026     Glucose 280 mg/dL      Comment: Serial Number: [id number]:  [id number]       Myoglobin, Serum [[id number]]  (Abnormal) Collected: 01/31/21 1355    Specimen: Blood Updated: 01/31/21 1934     Myoglobin 1,045.0 ng/mL     Narrative:      Results may be falsely decreased if patient taking Biotin.      POC Glucose Once [[id number]]  (Abnormal) Collected: 01/31/21 1736    Specimen: Blood Updated: 01/31/21 1923     Glucose 186 mg/dL      Comment: Serial Number: [id number]:  [id number]       POC Glucose Once [[id number]]  (Abnormal) Collected: 01/31/21 1220    Specimen: Blood Updated: 01/31/21 1922     Glucose 144 mg/dL      Comment: Serial Number: [id number]:  [id number]       aPTT [[id number]]  (Abnormal) Collected: 01/31/21 1819    Specimen: Blood Updated: 01/31/21 1911     PTT >139.0 seconds     Myoglobin Screen, Urine - Urine, Catheter [[id number]]  (Abnormal) Collected: 01/31/21 1351    Specimen: Urine, Catheter Updated: 01/31/21 1725     Myoglobin, Qualitative Positive    Narrative:      Due to the similarities in the chemical properties of Hemoglobin and and Myoglobin, the presence of either will yield a positive Myoglobin Screen.    Troponin [[id number]]  (Abnormal) Collected: 01/31/21 1355    Specimen: Blood Updated: 01/31/21 1425     Troponin T 2.300 ng/mL     Narrative:      Troponin T Reference Range:  <= 0.03 ng/mL-   Negative for AMI  >0.03 ng/mL-     Abnormal for myocardial necrosis.  Clinicians would have to utilize clinical acumen, EKG, Troponin and serial changes to determine if it is an Acute Myocardial Infarction or myocardial injury due to an underlying chronic condition.       Results may be falsely decreased if patient taking Biotin.      CK [[id number]]  (Abnormal) Collected: 01/31/21 1243    Specimen: Blood Updated: 01/31/21 1347     Creatine Kinase 728 U/L     Troponin [[id number]]  (Abnormal) Collected: 01/31/21 1243    Specimen: Blood Updated:  01/31/21 1310     Troponin T 2.300 ng/mL     Narrative:      Troponin T Reference Range:  <= 0.03 ng/mL-   Negative for AMI  >0.03 ng/mL-     Abnormal for myocardial necrosis.  Clinicians would have to utilize clinical acumen, EKG, Troponin and serial changes to determine if it is an Acute Myocardial Infarction or myocardial injury due to an underlying chronic condition.       Results may be falsely decreased if patient taking Biotin.      Lactic Acid, Plasma [[id number]]  (Abnormal) Collected: 01/31/21 1243    Specimen: Blood Updated: 01/31/21 1310     Lactate 3.6 mmol/L     Blood Gas, Arterial - [[id number]]  (Abnormal) Collected: 01/31/21 0954    Specimen: Arterial Blood Updated: 01/31/21 1000     Site Left Brachial     Nilson's Test N/A     pH, Arterial 7.252 pH units      pCO2, Arterial 49.3 mm Hg      pO2, Arterial 69.9 mm Hg      HCO3, Arterial 21.7 mmol/L      Base Excess, Arterial -5.8 mmol/L      Comment: Serial Number: [id number]:  [id number]        O2 Saturation, Arterial 90.6 %      CO2 Content 23.2 mmol/L      Barometric Pressure for Blood Gas --     Comment: N/A        Modality Adult Vent     FIO2 100 %      Ventilator Mode ;AC     Set Tidal Volume 550     PEEP 10     Hemodilution No     Respiratory Rate 32           RADIOLOGY:  Xr Chest 1 View    Result Date: 2/1/2021   DATE OF EXAM: 2/1/2021 4:44 AM  PROCEDURE: XR CHEST 1 VW-  INDICATIONS: SOA; J96.02-Acute respiratory failure with hypercapnia; A41.9-Sepsis, unspecified organism; R65.21-Severe sepsis with septic shock; R77.8-Other specified abnormalities of plasma proteins; R79.89-Other specified abnormal findings of blood chemistry; E87.6-Hypokalemia  COMPARISON: 1/31/2021 at 6:30 AM  TECHNIQUE: [Portable chest radiograph]  FINDINGS: Diffuse ill-defined infiltrates are again seen throughout the lungs bilaterally with associated interstitial changes. There is mild worsening. The cardiac silhouette and mediastinum are stable. The endotracheal tube is  stable in position. A nasogastric tube extends below the diaphragm to the stomach. A right PICC line is observed with the distal tip positioned at the SVC/right atrial junction. The line is in good position and is ready for use.      1.Mild worsening of diffuse airspace infiltrates and diffuse interstitial changes bilaterally. 2.The endotracheal tube is stable in position. 3.The nasogastric tube extends below the diaphragm to the stomach. 4.The right PICC line distal tip is seen at the SVC/right atrial junction. The line is in good position and is ready for use.  Electronically Signed By-Willard Garcia MD On:2/1/2021 7:53 AM This report was finalized on [id number] by  Willard Garcia MD.    Xr Chest 1 View    Result Date: 1/31/2021  Chest one view. DATE: 1/31/2021 at 6:34 AM. COMPARISON: 1/31/2021 at 1:14 AM. CLINICAL HISTORY: Hypoxia     : There is slight worsening of the airspace and interstitial opacity seen throughout the lungs bilaterally, left greater than right. Endotracheal tube remains present. The cardiac silhouette is not enlarged. Likely small left pleural effusion and probable trace right pleural effusion. Electronically signed by:  Maximus Kat D.O.  1/31/2021 5:02 AM    Xr Abdomen Kub    Result Date: 1/31/2021  DATE OF EXAM: 1/31/2021 5:38 PM  PROCEDURE: XR ABDOMEN KUB-  INDICATIONS: NG tube; J96.02-Acute respiratory failure with hypercapnia; A41.9-Sepsis, unspecified organism; R65.21-Severe sepsis with septic shock; R77.8-Other specified abnormalities of plasma proteins; R79.89-Other specified abnormal findings of blood chemistry; E87.6-Hypokalemia  COMPARISON: KUB dated 01/31/2021 at 3:15 p.m.  TECHNIQUE: Single radiographic view of the abdomen was obtained.  FINDINGS: There is a gastric suction tube which courses below the left hemidiaphragm in expected position. There is a nonspecific nonobstructive small bowel gas pattern. There is left basilar airspace disease.      1. Gastric suction tube  in expected position with tip terminating below the left hemidiaphragm.  Electronically Signed By-Suleman Fonseca MD On:1/31/2021 6:04 PM This report was finalized on [id number] by  Suleman Fonseca MD.    Xr Abdomen Kub    Result Date: 1/31/2021  DATE OF EXAM: 1/31/2021 3:15 AM  PROCEDURE: XR ABDOMEN KUB-  INDICATIONS: septic unknown etiology; J96.02-Acute respiratory failure with hypercapnia; A41.9-Sepsis, unspecified organism; R65.21-Severe sepsis with septic shock; R77.8-Other specified abnormalities of plasma proteins; R79.89-Other specified abnormal findings of blood chemistry; E87.6-Hypokalemia  COMPARISON: Chest radiograph dated 01/31/2021  TECHNIQUE: Single radiographic view of the abdomen was obtained.  FINDINGS: There is left basilar airspace opacity likely related to underlying pneumonia. There is nonspecific nonobstructive small bowel gas pattern. There is a moderate rectal stool burden. There are no renal calcifications. There is faint opacification of the bilateral renal collecting systems as well as the collapsed urinary bladder likely related to recent contrast administration. There is a probable Taylor catheter present.      1. Nonspecific nonobstructive small bowel gas pattern. 2. Moderate rectal stool burden. 3. Left basilar airspace disease.  Electronically Signed By-Suleman Fonseca MD On:1/31/2021 4:33 PM This report was finalized on [id number] by  Suleman Fonseca MD.      ECHOCARDIOGRAM:  Results for orders placed during the hospital encounter of 01/31/21   Adult Transthoracic Echo Complete W/ Cont if Necessary Per Protocol    Narrative · Left ventricular ejection fraction appears to be 61 - 65%.  · Severe mitral valve regurgitation is present.  · The right ventricular cavity is mildly dilated.  · No pericardial effusion noted           I reviewed the patient's new clinical results.    This note has been scribed by me for pulmonary attending physician.    Electronically signed by  Umm Rogel, APRN, 02/01/21 at 08:29 EST.     I personally have examined  and interviewed the patient. I have reviewed the history, data, problems, assessment and plan with our NP.  Critical care time in direct medical management (   ) minutes   Efrain Alvares MD, D,ABSM  2/1/2021

## 2021-02-01 NOTE — PROGRESS NOTES
Referring Provider: Intensivist    Reason for follow-up: Non-STEMI and MR     Patient Care Team:  Provider, No Known as PCP - General    Subjective .  Intubated    Objective  Intubated     Review of Systems   Unable to perform ROS: intubated       Patient has no known allergies.    Scheduled Meds:ampicillin-sulbactam, 3 g, Intravenous, Q8H  Beneprotein, 2 packet, Nasogastric, BID  budesonide, 0.5 mg, Nebulization, BID - RT  doxycycline, 100 mg, Intravenous, Q12H  famotidine, 20 mg, Intravenous, Q12H  insulin lispro, 0-24 Units, Subcutaneous, Q6H  ipratropium-albuterol, 3 mL, Nebulization, Q4H - RT  methylPREDNISolone sodium succinate, 60 mg, Intravenous, Q8H  sodium chloride, 3 mL, Intravenous, Q12H      Continuous Infusions:custom IV infusion builder, , Last Rate: 250 mL/hr at 02/01/21 0427  DOPamine, 2-20 mcg/kg/min, Last Rate: Stopped (01/31/21 0730)  epoprostenol, 50 ng/kg/min (Ideal), Last Rate: 50 ng/kg/min (02/01/21 0951)  fentanyl 10 mcg/mL,  mcg/hr, Last Rate: 25 mcg/hr (01/31/21 2004)  heparin, 17.05 Units/kg/hr, Last Rate: 13 Units/kg/hr (02/01/21 0930)  midazolam, 1-10 mg/hr, Last Rate: 2 mg/hr (02/01/21 1004)  norepinephrine, 0.02-0.3 mcg/kg/min, Last Rate: Stopped (01/31/21 2022)  phenylephrine, 0.5-3 mcg/kg/min, Last Rate: 1 mcg/kg/min (02/01/21 1004)  propofol, 5-50 mcg/kg/min, Last Rate: Stopped (02/01/21 1004)  vasopressin, 0.03 Units/min, Last Rate: 0.03 Units/min (01/31/21 2117)      PRN Meds:.•  acetaminophen **OR** acetaminophen  •  Calcium Gluconate-NaCl **AND** calcium gluconate **AND** Calcium, Ionized  •  dextrose  •  dextrose  •  fentaNYL citrate (PF)  •  glucagon (human recombinant)  •  heparin  •  heparin  •  insulin lispro **AND** insulin lispro  •  LORazepam  •  magnesium sulfate **OR** magnesium sulfate in D5W 1g/100mL (PREMIX)  •  midazolam  •  ondansetron  •  potassium chloride **OR** potassium chloride **OR** potassium chloride  •  potassium chloride  •  potassium phosphate  "infusion greater than 15 mMoles **OR** potassium phosphate infusion greater than 15 mMoles **OR** potassium phosphate **OR** sodium phosphate IVPB **OR** sodium phosphate IVPB **OR** sodium phosphate IVPB  •  sodium chloride  •  sodium chloride        VITAL SIGNS  Vitals:    02/01/21 0917 02/01/21 0951 02/01/21 1159 02/01/21 1201   BP:  105/76     BP Location:       Patient Position:       Pulse: 92 91 93 92   Resp: 28      Temp:       TempSrc:       SpO2: 92%  100% 100%   Weight:       Height:           Flowsheet Rows      First Filed Value   Admission Height  170.2 cm (67\") Documented at 01/31/2021 0120   Admission Weight  88 kg (194 lb 0.1 oz) Documented at 01/31/2021 0120           TELEMETRY: Sinus rhythm    Physical Exam:  Constitutional:       Appearance: Well-developed.   Eyes:      General: No scleral icterus.  HENT:      Head: Normocephalic and atraumatic.   Neck:      Vascular: No carotid bruit or JVD.   Pulmonary:      Breath sounds: No wheezing. Rhonchi present. No rales.   Cardiovascular:      Normal rate. Regular rhythm.      Murmurs: There is a systolic murmur.   Pulses:     Intact distal pulses.   Abdominal:      General: Bowel sounds are normal.      Palpations: Abdomen is soft.   Musculoskeletal: Normal range of motion.   Skin:     General: Skin is warm and dry.      Findings: No rash.   Neurological:      Comments: Intubated          Results Review:   I reviewed the patient's new clinical results.  Lab Results (last 24 hours)     Procedure Component Value Units Date/Time    POC Glucose Once [[id number]]  (Abnormal) Collected: 02/01/21 1202    Specimen: Blood Updated: 02/01/21 1215     Glucose 273 mg/dL      Comment: Serial Number: [id number]:  [id number]       Hemoglobin A1c [[id number]]  (Abnormal) Collected: 01/31/21 0141    Specimen: Blood from Arm, Right Updated: 02/01/21 0951     Hemoglobin A1C 5.7 %     Narrative:      Hemoglobin A1C Reference Range:    <5.7 %        Normal  5.7-6.4 %    "  Increased risk for diabetes  > 6.4 %        Diabetes       These guidelines have been recommended by the American Diabetic Association for Hgb A1c.      The following 2010 guidelines have been recommended by the American Diabetes Association for Hemoglobin A1c.    HBA1c 5.7-6.4% Increased risk for future diabetes (pre-diabetes)  HBA1c     >6.4% Diabetes      Urine Culture - Urine, Urine, Catheter [[id number]]  (Normal) Collected: 01/31/21 0407    Specimen: Urine, Catheter Updated: 02/01/21 0933     Urine Culture No growth    Respiratory Culture - Sputum, ET Suction [[id number]] Collected: 01/31/21 0511    Specimen: Sputum from ET Suction Updated: 02/01/21 0913     Respiratory Culture Scant growth (1+) Normal Respiratory Cely: NO S.aureus/MRSA or Pseudomonas aeruginosa     Gram Stain Moderate (3+) WBCs per low power field      Rare (1+) Epithelial cells per low power field      Few (2+) Mixed bacterial morphotypes seen on Gram Stain    POC Glucose Once [[id number]]  (Abnormal) Collected: 02/01/21 0643    Specimen: Blood Updated: 02/01/21 0644     Glucose 220 mg/dL      Comment: Serial Number: [id number]:  [id number]       Basic Metabolic Panel [[id number]]  (Abnormal) Collected: 02/01/21 0432    Specimen: Blood Updated: 02/01/21 0533     Glucose 319 mg/dL      BUN 31 mg/dL      Comment: Result checked         Creatinine 2.69 mg/dL      Sodium 137 mmol/L      Potassium 4.4 mmol/L      Comment: Result checked         Chloride 98 mmol/L      CO2 16.0 mmol/L      Calcium 6.6 mg/dL      eGFR Non African Amer 24 mL/min/1.73      BUN/Creatinine Ratio 11.5     Anion Gap 23.0 mmol/L     Narrative:      GFR Normal >60  Chronic Kidney Disease <60  Kidney Failure <15      Magnesium [[id number]]  (Normal) Collected: 02/01/21 0432    Specimen: Blood Updated: 02/01/21 0533     Magnesium 2.3 mg/dL     Phosphorus [[id number]]  (Abnormal) Collected: 02/01/21 0432    Specimen: Blood Updated: 02/01/21 0533     Phosphorus 8.0 mg/dL      Triglycerides [[id number]]  (Normal) Collected: 02/01/21 0432    Specimen: Blood Updated: 02/01/21 0516     Triglycerides 97 mg/dL     aPTT [[id number]]  (Abnormal) Collected: 02/01/21 0432    Specimen: Blood Updated: 02/01/21 0457     PTT 54.7 seconds     CBC & Differential [[id number]]  (Abnormal) Collected: 02/01/21 0432    Specimen: Blood Updated: 02/01/21 0450    Narrative:      The following orders were created for panel order CBC & Differential.  Procedure                               Abnormality         Status                     ---------                               -----------         ------                     CBC Auto Differential[[id number]]        Abnormal            Final result                 Please view results for these tests on the individual orders.    CBC Auto Differential [[id number]]  (Abnormal) Collected: 02/01/21 0432    Specimen: Blood Updated: 02/01/21 0450     WBC 17.30 10*3/mm3      Comment: Result checked         RBC 4.69 10*6/mm3      Hemoglobin 13.9 g/dL      Hematocrit 43.4 %      MCV 92.6 fL      MCH 29.7 pg      MCHC 32.0 g/dL      RDW 16.0 %      RDW-SD 52.9 fl      MPV 7.8 fL      Platelets 163 10*3/mm3      Neutrophil % 94.2 %      Lymphocyte % 3.5 %      Monocyte % 2.2 %      Eosinophil % 0.0 %      Basophil % 0.1 %      Neutrophils, Absolute 16.30 10*3/mm3      Lymphocytes, Absolute 0.60 10*3/mm3      Monocytes, Absolute 0.40 10*3/mm3      Eosinophils, Absolute 0.00 10*3/mm3      Basophils, Absolute 0.00 10*3/mm3      nRBC 0.1 /100 WBC     Blood Gas, Arterial - [[id number]]  (Abnormal) Collected: 02/01/21 0309    Specimen: Arterial Blood Updated: 02/01/21 0322     Site Arterial Line     Nilson's Test Positive     pH, Arterial 7.131 pH units      pCO2, Arterial 46.8 mm Hg      pO2, Arterial 61.1 mm Hg      HCO3, Arterial 15.6 mmol/L      Base Excess, Arterial -13.5 mmol/L      Comment: Serial Number: [id number]:  [id number]        O2 Saturation, Arterial 82.3 %      CO2 Content  17.0 mmol/L      Barometric Pressure for Blood Gas --     Comment: N/A        Modality Adult Vent     FIO2 100 %      Ventilator Mode ;AC     Set Tidal Volume 550     PEEP 14     Hemodilution No     Respiratory Rate 28    aPTT [[id number]]  (Abnormal) Collected: 02/01/21 0233    Specimen: Blood Updated: 02/01/21 0313     .2 seconds     Blood Culture - Blood, Arm, Right [[id number]] Collected: 01/31/21 0141    Specimen: Blood from Arm, Right Updated: 02/01/21 0200     Blood Culture No growth at 24 hours    Blood Culture - Blood, Arm, Left [[id number]] Collected: 01/31/21 0141    Specimen: Blood from Arm, Left Updated: 02/01/21 0200     Blood Culture No growth at 24 hours    Blood Gas, Arterial - [[id number]]  (Abnormal) Collected: 02/01/21 0022    Specimen: Arterial Blood Updated: 02/01/21 0029     Site Arterial Line     Nilson's Test N/A     pH, Arterial 7.159 pH units      pCO2, Arterial 43.2 mm Hg      pO2, Arterial 102.4 mm Hg      HCO3, Arterial 15.4 mmol/L      Base Excess, Arterial -13.0 mmol/L      Comment: Serial Number: [id number]:  [id number]        O2 Saturation, Arterial 95.8 %      CO2 Content 16.7 mmol/L      Barometric Pressure for Blood Gas --     Comment: N/A        Modality Aerosol Mask     FIO2 100 %      Ventilator Mode ;AC     Set Tidal Volume 550     PEEP 14     Hemodilution No     Respiratory Rate 28    POC Glucose Once [[id number]]  (Abnormal) Collected: 02/01/21 0022    Specimen: Blood Updated: 02/01/21 0026     Glucose 280 mg/dL      Comment: Serial Number: [id number]:  [id number]       Myoglobin, Serum [[id number]]  (Abnormal) Collected: 01/31/21 1355    Specimen: Blood Updated: 01/31/21 1934     Myoglobin 1,045.0 ng/mL     Narrative:      Results may be falsely decreased if patient taking Biotin.      POC Glucose Once [[id number]]  (Abnormal) Collected: 01/31/21 1736    Specimen: Blood Updated: 01/31/21 1923     Glucose 186 mg/dL      Comment: Serial Number: [id number]:  [id number]         POC Glucose Once [[id number]]  (Abnormal) Collected: 01/31/21 1220    Specimen: Blood Updated: 01/31/21 1922     Glucose 144 mg/dL      Comment: Serial Number: [id number]:  [id number]       aPTT [[id number]]  (Abnormal) Collected: 01/31/21 1819    Specimen: Blood Updated: 01/31/21 1911     PTT >139.0 seconds     Myoglobin Screen, Urine - Urine, Catheter [[id number]]  (Abnormal) Collected: 01/31/21 1351    Specimen: Urine, Catheter Updated: 01/31/21 1725     Myoglobin, Qualitative Positive    Narrative:      Due to the similarities in the chemical properties of Hemoglobin and and Myoglobin, the presence of either will yield a positive Myoglobin Screen.    Troponin [[id number]]  (Abnormal) Collected: 01/31/21 1355    Specimen: Blood Updated: 01/31/21 1425     Troponin T 2.300 ng/mL     Narrative:      Troponin T Reference Range:  <= 0.03 ng/mL-   Negative for AMI  >0.03 ng/mL-     Abnormal for myocardial necrosis.  Clinicians would have to utilize clinical acumen, EKG, Troponin and serial changes to determine if it is an Acute Myocardial Infarction or myocardial injury due to an underlying chronic condition.       Results may be falsely decreased if patient taking Biotin.      CK [[id number]]  (Abnormal) Collected: 01/31/21 1243    Specimen: Blood Updated: 01/31/21 1347     Creatine Kinase 728 U/L     Troponin [[id number]]  (Abnormal) Collected: 01/31/21 1243    Specimen: Blood Updated: 01/31/21 1310     Troponin T 2.300 ng/mL     Narrative:      Troponin T Reference Range:  <= 0.03 ng/mL-   Negative for AMI  >0.03 ng/mL-     Abnormal for myocardial necrosis.  Clinicians would have to utilize clinical acumen, EKG, Troponin and serial changes to determine if it is an Acute Myocardial Infarction or myocardial injury due to an underlying chronic condition.       Results may be falsely decreased if patient taking Biotin.      Lactic Acid, Plasma [[id number]]  (Abnormal) Collected: 01/31/21 1243    Specimen: Blood  Updated: 01/31/21 1310     Lactate 3.6 mmol/L           Imaging Results (Last 24 Hours)     Procedure Component Value Units Date/Time    XR Chest 1 View [[id number]] Collected: 02/01/21 0751     Updated: 02/01/21 0755    Narrative:         DATE OF EXAM:   2/1/2021 4:44 AM     PROCEDURE:   XR CHEST 1 VW-     INDICATIONS:   SOA; J96.02-Acute respiratory failure with hypercapnia; A41.9-Sepsis,  unspecified organism; R65.21-Severe sepsis with septic shock;  R77.8-Other specified abnormalities of plasma proteins; R79.89-Other  specified abnormal findings of blood chemistry; E87.6-Hypokalemia     COMPARISON:  1/31/2021 at 6:30 AM     TECHNIQUE:   [Portable chest radiograph]     FINDINGS:   Diffuse ill-defined infiltrates are again seen throughout the lungs  bilaterally with associated interstitial changes. There is mild  worsening. The cardiac silhouette and mediastinum are stable. The  endotracheal tube is stable in position. A nasogastric tube extends  below the diaphragm to the stomach. A right PICC line is observed with  the distal tip positioned at the SVC/right atrial junction. The line is  in good position and is ready for use.        Impression:      1.Mild worsening of diffuse airspace infiltrates and diffuse  interstitial changes bilaterally.  2.The endotracheal tube is stable in position.  3.The nasogastric tube extends below the diaphragm to the stomach.  4.The right PICC line distal tip is seen at the SVC/right atrial  junction. The line is in good position and is ready for use.     Electronically Signed By-Willard Garcia MD On:2/1/2021 7:53 AM  This report was finalized on [id number] by  Willard Garcia MD.    XR Abdomen KUB [[id number]] Collected: 01/31/21 1803     Updated: 01/31/21 1806    Narrative:      DATE OF EXAM:  1/31/2021 5:38 PM     PROCEDURE:  XR ABDOMEN KUB-     INDICATIONS:  NG tube; J96.02-Acute respiratory failure with hypercapnia;  A41.9-Sepsis, unspecified organism; R65.21-Severe sepsis  with septic  shock; R77.8-Other specified abnormalities of plasma proteins;  R79.89-Other specified abnormal findings of blood chemistry;  E87.6-Hypokalemia     COMPARISON:  KUB dated 01/31/2021 at 3:15 p.m.     TECHNIQUE:   Single radiographic view of the abdomen was obtained.     FINDINGS:  There is a gastric suction tube which courses below the left  hemidiaphragm in expected position. There is a nonspecific  nonobstructive small bowel gas pattern. There is left basilar airspace  disease.        Impression:      1. Gastric suction tube in expected position with tip terminating below  the left hemidiaphragm.     Electronically Signed By-Suleman Fonseca MD On:1/31/2021 6:04 PM  This report was finalized on [id number] by  Suleman Fonseca MD.    XR Abdomen KUB [[id number]] Collected: 01/31/21 1631     Updated: 01/31/21 1635    Narrative:      DATE OF EXAM:  1/31/2021 3:15 AM     PROCEDURE:  XR ABDOMEN KUB-     INDICATIONS:  septic unknown etiology; J96.02-Acute respiratory failure with  hypercapnia; A41.9-Sepsis, unspecified organism; R65.21-Severe sepsis  with septic shock; R77.8-Other specified abnormalities of plasma  proteins; R79.89-Other specified abnormal findings of blood chemistry;  E87.6-Hypokalemia     COMPARISON:  Chest radiograph dated 01/31/2021     TECHNIQUE:   Single radiographic view of the abdomen was obtained.     FINDINGS:  There is left basilar airspace opacity likely related to underlying  pneumonia. There is nonspecific nonobstructive small bowel gas pattern.  There is a moderate rectal stool burden. There are no renal  calcifications. There is faint opacification of the bilateral renal  collecting systems as well as the collapsed urinary bladder likely  related to recent contrast administration. There is a probable Taylor  catheter present.       Impression:      1. Nonspecific nonobstructive small bowel gas pattern.  2. Moderate rectal stool burden.  3. Left basilar airspace disease.        Electronically Signed By-Suleman Fonseca MD On:1/31/2021 4:33 PM  This report was finalized on [id number] by  Suleman Fonseca MD.          EKG      I personally viewed and interpreted the patient's EKG/Telemetry data:    ECHOCARDIOGRAM:    STRESS MYOVIEW:    CARDIAC CATHETERIZATION:    OTHER:         Assessment/Plan     Active Problems:    Acute respiratory failure with hypercapnia (CMS/HCC)    Septic shock (CMS/HCC)    Hypokalemia    Hypocalcemia    Hypomagnesemia    Elevated d-dimer    Elevated troponin    Elevated brain natriuretic peptide (BNP) level    Hyperglycemia    PNA (pneumonia)    UTI (urinary tract infection)  Acute renal failure  Severe mitral regurgitation    Patient presented with hypoxic respiratory failure and is intubated  Patient has septic shock and is on antibiotics secondary to pneumonia and UTI  Patient has elevated troponin and has non-STEMI and is on heparin  Patient had an echocardiogram which showed normal LV systolic function but had severe mitral regurgitation  Patient also had elevated D-dimer but did not show any pulmonary embolism.  Once patient is stable and extubated will need cardiac catheterization to assess his coronary arteries.    I discussed the patients findings and my recommendations with patient's family    Mo Kong MD  02/01/21  12:31 EST

## 2021-02-01 NOTE — PLAN OF CARE
Goal Outcome Evaluation:  Plan of Care Reviewed With: patient  Progress: no change  Outcome Summary: Pt remains on vasopressor support. Still requires very high vent settings as well as continuous inhaled Flolan. Severe metabolic acidosis continues. Pt barely arouseable on minimal sedation. Family still considering withdrawal of care.

## 2021-02-01 NOTE — CONSULTS
[facility]:    Referring Provider: Dr. Alvares  Reason for Consultation: ARVIND    Subjective     Chief complaint: Respiratory distress    History of present illness:  Patient is a 69 yo WM with h/o DM and COPD who presented yesterday with severe acute respiratory distress.  He ultimately required intubation and mechanical ventilation.  His Cr was 0.68 on admission.  He had a CT with contrast which was negative for PE.  He has been diagnosed with PNA and sepsis and is currently in the ICU on Adin and Vasopressin for BP support.  UOP is minimal and Cr has jumped to 2.69 today with continued metabolic acidosis.  Discussed with nurse and family at bedside.      History  No past medical history on file., No past surgical history on file., No family history on file.,   Social History     Tobacco Use   • Smoking status: Current Every Day Smoker     Packs/day: 3.00     Years: 15.00     Pack years: 45.00   • Smokeless tobacco: Never Used   Substance Use Topics   • Alcohol use: Not on file   • Drug use: Not on file     E-cigarette/Vaping     E-cigarette/Vaping Substances     E-cigarette/Vaping Devices       ,   No medications prior to admission.   , Scheduled Meds:  Beneprotein, 2 packet, Nasogastric, BID  budesonide, 0.5 mg, Nebulization, BID - RT  cefTRIAXone, 2 g, Intravenous, Q24H  famotidine, 20 mg, Intravenous, Q12H  insulin lispro, 0-24 Units, Subcutaneous, Q6H  ipratropium-albuterol, 3 mL, Nebulization, Q4H - RT  methylPREDNISolone sodium succinate, 60 mg, Intravenous, Q8H  sodium chloride, 3 mL, Intravenous, Q12H    , Continuous Infusions:  custom IV infusion builder, , Last Rate: 250 mL/hr at 02/01/21 0427  DOPamine, 2-20 mcg/kg/min, Last Rate: Stopped (01/31/21 0730)  epoprostenol, 50 ng/kg/min (Ideal), Last Rate: 50 ng/kg/min (02/01/21 0951)  fentanyl 10 mcg/mL,  mcg/hr, Last Rate: 25 mcg/hr (01/31/21 2004)  heparin, 17.05 Units/kg/hr, Last Rate: 13 Units/kg/hr (02/01/21 0930)  midazolam, 1-10 mg/hr, Last Rate: 2  mg/hr (02/01/21 1004)  norepinephrine, 0.02-0.3 mcg/kg/min, Last Rate: Stopped (01/31/21 2022)  phenylephrine, 0.5-3 mcg/kg/min, Last Rate: 1 mcg/kg/min (02/01/21 1004)  Phoxillum 4 K/2.5 CA, 1,000 mL/hr  Phoxillum 4 K/2.5 CA, 1,000 mL/hr  Phoxillum 4 K/2.5 CA, 1,000 mL/hr  propofol, 5-50 mcg/kg/min, Last Rate: Stopped (02/01/21 1004)  vasopressin, 0.03 Units/min, Last Rate: 0.03 Units/min (02/01/21 1315)    , PRN Meds:  •  acetaminophen **OR** acetaminophen  •  Calcium Gluconate-NaCl **AND** calcium gluconate **AND** Calcium, Ionized  •  calcium gluconate **OR** calcium gluconate  •  dextrose  •  dextrose  •  fentaNYL citrate (PF)  •  glucagon (human recombinant)  •  heparin (porcine)  •  heparin  •  heparin  •  insulin lispro **AND** insulin lispro  •  LORazepam  •  magnesium sulfate **OR** magnesium sulfate in D5W 1g/100mL (PREMIX)  •  magnesium sulfate  •  midazolam  •  ondansetron  •  potassium chloride **OR** potassium chloride **OR** potassium chloride  •  potassium chloride  •  potassium chloride  •  potassium phosphate infusion greater than 15 mMoles **OR** potassium phosphate infusion greater than 15 mMoles **OR** potassium phosphate **OR** sodium phosphate IVPB **OR** sodium phosphate IVPB **OR** sodium phosphate IVPB  •  sodium chloride  •  sodium chloride  •  sodium phosphate IVPB and Allergies:  Patient has no known allergies.    Review of Systems  Pertinent items are noted in HPI    Objective     Vital Signs  Temp:  [96.8 °F (36 °C)-100.2 °F (37.9 °C)] 96.8 °F (36 °C)  Heart Rate:  [] 92  Resp:  [28-31] 28  BP: (105)/(76) 105/76  Arterial Line BP: ()/(72-97) 101/76  FiO2 (%):  [100 %] 100 %    No intake/output data recorded.  I/O last 3 completed shifts:  In: 6771 [I.V.:6771]  Out: 765 [Urine:540; Emesis/NG output:225]    Physical Exam:  GEN: Intubated and Sedated  ENT: +ETT  NECK: Supple, no JVD  CHEST: Coarse bilaterally  CV: RRR, no M/G/R  ABD: Soft, +BS  SKIN: Cool extremities  NEURO:  Sedated      Results Review:   I reviewed the patient's new clinical results.    WBC WBC   Date Value Ref Range Status   02/01/2021 17.30 (H) 3.40 - 10.80 10*3/mm3 Final     Comment:     Result checked    01/31/2021 8.00 3.40 - 10.80 10*3/mm3 Final      HGB Hemoglobin   Date Value Ref Range Status   02/01/2021 13.9 13.0 - 17.7 g/dL Final   01/31/2021 15.1 13.0 - 17.7 g/dL Final      HCT Hematocrit   Date Value Ref Range Status   02/01/2021 43.4 37.5 - 51.0 % Final   01/31/2021 46.5 37.5 - 51.0 % Final      Platlets No results found for: LABPLAT   MCV MCV   Date Value Ref Range Status   02/01/2021 92.6 79.0 - 97.0 fL Final   01/31/2021 94.2 79.0 - 97.0 fL Final          Sodium Sodium   Date Value Ref Range Status   02/01/2021 137 136 - 145 mmol/L Final   01/31/2021 147 (H) 136 - 145 mmol/L Final      Potassium Potassium   Date Value Ref Range Status   02/01/2021 4.4 3.5 - 5.2 mmol/L Final     Comment:     Result checked    01/31/2021 2.4 (C) 3.5 - 5.2 mmol/L Final     Comment:     Slight hemolysis detected by analyzer. Results may be affected.      Chloride Chloride   Date Value Ref Range Status   02/01/2021 98 98 - 107 mmol/L Final   01/31/2021 117 (H) 98 - 107 mmol/L Final      CO2 CO2   Date Value Ref Range Status   02/01/2021 16.0 (L) 22.0 - 29.0 mmol/L Final   01/31/2021 17.0 (L) 22.0 - 29.0 mmol/L Final      BUN BUN   Date Value Ref Range Status   02/01/2021 31 (H) 8 - 23 mg/dL Final     Comment:     Result checked    01/31/2021 13 8 - 23 mg/dL Final      Creatinine Creatinine   Date Value Ref Range Status   02/01/2021 2.69 (H) 0.76 - 1.27 mg/dL Final   01/31/2021 0.68 (L) 0.76 - 1.27 mg/dL Final      Calcium Calcium   Date Value Ref Range Status   02/01/2021 6.6 (L) 8.6 - 10.5 mg/dL Final   01/31/2021 4.9 (C) 8.6 - 10.5 mg/dL Final      PO4 No results found for: CAPO4   Albumin Albumin   Date Value Ref Range Status   01/31/2021 1.80 (L) 3.50 - 5.20 g/dL Final      Magnesium Magnesium   Date Value Ref Range  Status   02/01/2021 2.3 1.6 - 2.4 mg/dL Final   01/31/2021 1.1 (L) 1.6 - 2.4 mg/dL Final      Uric Acid No results found for: URICACID         Beneprotein, 2 packet, Nasogastric, BID  budesonide, 0.5 mg, Nebulization, BID - RT  cefTRIAXone, 2 g, Intravenous, Q24H  famotidine, 20 mg, Intravenous, Q12H  insulin lispro, 0-24 Units, Subcutaneous, Q6H  ipratropium-albuterol, 3 mL, Nebulization, Q4H - RT  methylPREDNISolone sodium succinate, 60 mg, Intravenous, Q8H  sodium chloride, 3 mL, Intravenous, Q12H      custom IV infusion builder, , Last Rate: 250 mL/hr at 02/01/21 0427  DOPamine, 2-20 mcg/kg/min, Last Rate: Stopped (01/31/21 0730)  epoprostenol, 50 ng/kg/min (Ideal), Last Rate: 50 ng/kg/min (02/01/21 0951)  fentanyl 10 mcg/mL,  mcg/hr, Last Rate: 25 mcg/hr (01/31/21 2004)  heparin, 17.05 Units/kg/hr, Last Rate: 13 Units/kg/hr (02/01/21 0930)  midazolam, 1-10 mg/hr, Last Rate: 2 mg/hr (02/01/21 1004)  norepinephrine, 0.02-0.3 mcg/kg/min, Last Rate: Stopped (01/31/21 2022)  phenylephrine, 0.5-3 mcg/kg/min, Last Rate: 1 mcg/kg/min (02/01/21 1004)  Phoxillum 4 K/2.5 CA, 1,000 mL/hr  Phoxillum 4 K/2.5 CA, 1,000 mL/hr  Phoxillum 4 K/2.5 CA, 1,000 mL/hr  propofol, 5-50 mcg/kg/min, Last Rate: Stopped (02/01/21 1004)  vasopressin, 0.03 Units/min, Last Rate: 0.03 Units/min (02/01/21 1315)        Assessment/Plan     1) ARVIND/ATN  2) Septic shock  3) Metabolic acidosis  4) PNA  5) Acute resp failure  6) NSTEMI  7) UTI    PLAN: Patient with sepsis and septic shock with oliguric ARVIND with likely ATN.  Patient with rapidly rising waste products and metabolic acidosis.  Discussed with family and they wish to proceed with RRT.  Will place orders for CRRT.  ICU NP to place shiley.  Monitor serial labs.  Will follow closely and adjust therapy as indicated.    35 min CCT spent in eval/management of this critically ill patient    Willard Nixon MD   [facility]  02/01/21  @NOW

## 2021-02-01 NOTE — PROGRESS NOTES
Discharge Planning Assessment   Luis     Patient Name: Pillo Hensley  MRN: [id number]  Today's Date: 2/1/2021    Admit Date: 1/31/2021    Discharge Needs Assessment     Row Name 02/01/21 1126       Living Environment    Lives With  other (see comments)    Unique Family Situation  PT LIVES WITH EX-WIFE    Current Living Arrangements  home/apartment/condo    Able to Return to Prior Arrangements  yes       Resource/Environmental Concerns    Transportation Concerns  car, none       Transition Planning    Patient/Family Anticipates Transition to  home with family;inpatient rehabilitation facility    Patient/Family Anticipated Services at Transition  rehabilitation services;home health care       Discharge Needs Assessment    Readmission Within the Last 30 Days  no previous admission in last 30 days    Equipment Currently Used at Home  none    Row Name 02/01/21 0929       Living Environment    Lives With  alone    Current Living Arrangements  home/apartment/condo    Able to Return to Prior Arrangements  yes       Transition Planning    Patient/Family Anticipates Transition to  home with family    Transportation Anticipated  family or friend will provide       Discharge Needs Assessment    Readmission Within the Last 30 Days  no previous admission in last 30 days        Discharge Plan     Row Name 02/01/21 1202       Plan    Plan  D/C Plan : Pending clincal course . Pt is currently on the vent .    Patient/Family in Agreement with Plan  yes    Plan Comments  Barrier  to D/C : Pt is on the vent at 100% and peep of 14 . Pt is on a Dip gtt Hep gtt and Versed gtt . Elevated Trop and D-dimer        Continued Care and Services - Admitted Since 1/31/2021    Coordination has not been started for this encounter.         Demographic Summary     Row Name 02/01/21 1126       General Information    Admission Type  inpatient    Arrived From  emergency department    Preferred Language  English     Used During This  Interaction  no    Row Name 02/01/21 0929       General Information    Admission Type  inpatient    Arrived From  emergency department    Preferred Language  English     Used During This Interaction  no        Functional Status     Row Name 02/01/21 0929       Functional Status    Usual Activity Tolerance  good    Current Activity Tolerance  fair       Mental Status    General Appearance WDL  WDL       Mental Status Summary    Recent Changes in Mental Status/Cognitive Functioning  unable to assess on vent                Patti Walker RN

## 2021-02-01 NOTE — CONSULTS
Infectious Diseases Consult Note    Referring Provider: Efrain Alvares MD    Reason for Consultation: Pneumonia and septic shock    Patient Care Team:  Provider, No Known as PCP - General    Chief complaint     Currently intubated    Subjective     History of present illness:      This is 70-year-old white male who was admitted to [facility] on January 31, 2021.  Apparently his wife called the ambulance after patient became severely sick and weak.  He was found to be in acute respiratory failure and required to be intubated in emergency room.  The patient is currently in the ICU on the ventilator 100% FiO2 and 14 PEEP.  Patient was noted to have hypotension required to be started on 2 vasopressors and currently on vasopressin and Adin-Synephrine.  Patient was also noted to have acute kidney injury and has decreased urine output.  Nephrology service was consulted.  The patient was tested positive for pneumococcal antigen in the urine.  The patient has long history of tobacco abuse and he smokes around 3 packs of cigarettes a day.  The patient is known to have COPD but he does not follow with any physician.  Family mentioned that he was supposed to be on oxygen but patient never wear oxygen.    Review of Systems   Review of Systems   Unable to perform ROS: Intubated       Medications  No medications prior to admission.       History  No past medical history on file.  No past surgical history on file.    Family History  No family history on file.    Social History   reports that he has been smoking. He has a 45.00 pack-year smoking history. He has never used smokeless tobacco.    Allergies  Patient has no known allergies.    Objective     Vital Signs   Vital Signs (last 24 hours)       01/31 0700  -  02/01 0659 02/01 0700  -  02/01 1233   Most Recent    Temp (°F) 97.2 -  100.9      96.8     96.8 (36)    Heart Rate 81 -  137    91 -  94     92    Resp 28 -  38      28     28    BP 72/54 -  149/28      105/76        105/76    SpO2 (%) 59 -  100    92 -  100     100          Physical Exam:  Physical Exam  Constitutional:       Appearance: Normal appearance.   HENT:      Head: Normocephalic and atraumatic.   Eyes:      Pupils: Pupils are equal, round, and reactive to light.   Neck:      Musculoskeletal: Neck supple.   Pulmonary:      Comments: Barrel chest with decreased breathing sounds bilaterally  Abdominal:      General: Abdomen is flat. Bowel sounds are normal.      Palpations: Abdomen is soft.   Musculoskeletal:      Right lower leg: Edema present.      Left lower leg: Edema present.      Comments: Chronic venous stasis changes of both lower extremity.           Microbiology  Microbiology Results (last 10 days)     Procedure Component Value - Date/Time    Legionella Antigen, Urine - Urine, Urine, Clean Catch [[id number]]  (Normal) Collected: 01/31/21 0512    Lab Status: Final result Specimen: Urine, Clean Catch Updated: 01/31/21 0535     LEGIONELLA ANTIGEN, URINE Negative    S. Pneumo Ag Urine or CSF - Urine, Urine, Clean Catch [[id number]]  (Abnormal) Collected: 01/31/21 0512    Lab Status: Final result Specimen: Urine, Clean Catch Updated: 01/31/21 0532     Strep Pneumo Ag Positive    MRSA Screen, PCR (Inpatient) - Swab, Nares [[id number]]  (Normal) Collected: 01/31/21 0511    Lab Status: Final result Specimen: Swab from Nares Updated: 01/31/21 0637     MRSA PCR No MRSA Detected    Respiratory Culture - Sputum, ET Suction [[id number]] Collected: 01/31/21 0511    Lab Status: Preliminary result Specimen: Sputum from ET Suction Updated: 02/01/21 0913     Respiratory Culture Scant growth (1+) Normal Respiratory Cely: NO S.aureus/MRSA or Pseudomonas aeruginosa     Gram Stain Moderate (3+) WBCs per low power field      Rare (1+) Epithelial cells per low power field      Few (2+) Mixed bacterial morphotypes seen on Gram Stain    Urine Culture - Urine, Urine, Catheter [[id number]]  (Normal) Collected: 01/31/21 0407    Lab Status:  Final result Specimen: Urine, Catheter Updated: 02/01/21 0933     Urine Culture No growth    Respiratory Panel PCR w/COVID-19(SARS-CoV-2) PAXTON/KYREE/DHARMESH/PAD/COR/MAD/ADELE In-House, NP Swab in UTM/VTM, 3-4 HR TAT - Swab, Nasopharynx [[id number]]  (Normal) Collected: 01/31/21 0213    Lab Status: Final result Specimen: Swab from Nasopharynx Updated: 01/31/21 0309     ADENOVIRUS, PCR Not Detected     Coronavirus 229E Not Detected     Coronavirus HKU1 Not Detected     Coronavirus NL63 Not Detected     Coronavirus OC43 Not Detected     COVID19 Not Detected     Human Metapneumovirus Not Detected     Human Rhinovirus/Enterovirus Not Detected     Influenza A PCR Not Detected     Influenza B PCR Not Detected     Parainfluenza Virus 1 Not Detected     Parainfluenza Virus 2 Not Detected     Parainfluenza Virus 3 Not Detected     Parainfluenza Virus 4 Not Detected     RSV, PCR Not Detected     Bordetella pertussis pcr Not Detected     Bordetella parapertussis PCR Not Detected     Chlamydophila pneumoniae PCR Not Detected     Mycoplasma pneumo by PCR Not Detected    Narrative:      Fact sheet for providers: https://docs.Ordr.in/wp-content/uploads/VLW5876-4292-TH3.1-EUA-Provider-Fact-Sheet-3.pdf    Fact sheet for patients: https://docs.Ordr.in/wp-content/uploads/YGV2463-4175-BV1.1-EUA-Patient-Fact-Sheet-1.pdf    Test performed by PCR.    Blood Culture - Blood, Arm, Right [[id number]] Collected: 01/31/21 0141    Lab Status: Preliminary result Specimen: Blood from Arm, Right Updated: 02/01/21 0200     Blood Culture No growth at 24 hours    Blood Culture - Blood, Arm, Left [[id number]] Collected: 01/31/21 0141    Lab Status: Preliminary result Specimen: Blood from Arm, Left Updated: 02/01/21 0200     Blood Culture No growth at 24 hours          Laboratory  Results from last 7 days   Lab Units 02/01/21  0432   WBC 10*3/mm3 17.30*   HEMOGLOBIN g/dL 13.9   HEMATOCRIT % 43.4   PLATELETS 10*3/mm3 163     Results from last 7 days   Lab  Units 02/01/21  0432   SODIUM mmol/L 137   POTASSIUM mmol/L 4.4   CHLORIDE mmol/L 98   CO2 mmol/L 16.0*   BUN mg/dL 31*   CREATININE mg/dL 2.69*   GLUCOSE mg/dL 319*   CALCIUM mg/dL 6.6*     Results from last 7 days   Lab Units 02/01/21  0432   SODIUM mmol/L 137   POTASSIUM mmol/L 4.4   CHLORIDE mmol/L 98   CO2 mmol/L 16.0*   BUN mg/dL 31*   CREATININE mg/dL 2.69*   GLUCOSE mg/dL 319*   CALCIUM mg/dL 6.6*     Results from last 7 days   Lab Units 01/31/21  1243   CK TOTAL U/L 728*               Radiology  Imaging Results (Last 72 Hours)     Procedure Component Value Units Date/Time    XR Chest 1 View [[id number]] Collected: 02/01/21 0751     Updated: 02/01/21 0755    Narrative:         DATE OF EXAM:   2/1/2021 4:44 AM     PROCEDURE:   XR CHEST 1 VW-     INDICATIONS:   SOA; J96.02-Acute respiratory failure with hypercapnia; A41.9-Sepsis,  unspecified organism; R65.21-Severe sepsis with septic shock;  R77.8-Other specified abnormalities of plasma proteins; R79.89-Other  specified abnormal findings of blood chemistry; E87.6-Hypokalemia     COMPARISON:  1/31/2021 at 6:30 AM     TECHNIQUE:   [Portable chest radiograph]     FINDINGS:   Diffuse ill-defined infiltrates are again seen throughout the lungs  bilaterally with associated interstitial changes. There is mild  worsening. The cardiac silhouette and mediastinum are stable. The  endotracheal tube is stable in position. A nasogastric tube extends  below the diaphragm to the stomach. A right PICC line is observed with  the distal tip positioned at the SVC/right atrial junction. The line is  in good position and is ready for use.        Impression:      1.Mild worsening of diffuse airspace infiltrates and diffuse  interstitial changes bilaterally.  2.The endotracheal tube is stable in position.  3.The nasogastric tube extends below the diaphragm to the stomach.  4.The right PICC line distal tip is seen at the SVC/right atrial  junction. The line is in good position and is  ready for use.     Electronically Signed By-Willard Garcia MD On:2/1/2021 7:53 AM  This report was finalized on [id number] by  Willard Garcia MD.    XR Abdomen KUB [[id number]] Collected: 01/31/21 1803     Updated: 01/31/21 1806    Narrative:      DATE OF EXAM:  1/31/2021 5:38 PM     PROCEDURE:  XR ABDOMEN KUB-     INDICATIONS:  NG tube; J96.02-Acute respiratory failure with hypercapnia;  A41.9-Sepsis, unspecified organism; R65.21-Severe sepsis with septic  shock; R77.8-Other specified abnormalities of plasma proteins;  R79.89-Other specified abnormal findings of blood chemistry;  E87.6-Hypokalemia     COMPARISON:  KUB dated 01/31/2021 at 3:15 p.m.     TECHNIQUE:   Single radiographic view of the abdomen was obtained.     FINDINGS:  There is a gastric suction tube which courses below the left  hemidiaphragm in expected position. There is a nonspecific  nonobstructive small bowel gas pattern. There is left basilar airspace  disease.        Impression:      1. Gastric suction tube in expected position with tip terminating below  the left hemidiaphragm.     Electronically Signed By-Suleman Fonseca MD On:1/31/2021 6:04 PM  This report was finalized on [id number] by  Suleman Fonseca MD.    XR Abdomen KUB [[id number]] Collected: 01/31/21 1631     Updated: 01/31/21 1635    Narrative:      DATE OF EXAM:  1/31/2021 3:15 AM     PROCEDURE:  XR ABDOMEN KUB-     INDICATIONS:  septic unknown etiology; J96.02-Acute respiratory failure with  hypercapnia; A41.9-Sepsis, unspecified organism; R65.21-Severe sepsis  with septic shock; R77.8-Other specified abnormalities of plasma  proteins; R79.89-Other specified abnormal findings of blood chemistry;  E87.6-Hypokalemia     COMPARISON:  Chest radiograph dated 01/31/2021     TECHNIQUE:   Single radiographic view of the abdomen was obtained.     FINDINGS:  There is left basilar airspace opacity likely related to underlying  pneumonia. There is nonspecific nonobstructive small bowel  gas pattern.  There is a moderate rectal stool burden. There are no renal  calcifications. There is faint opacification of the bilateral renal  collecting systems as well as the collapsed urinary bladder likely  related to recent contrast administration. There is a probable Taylor  catheter present.       Impression:      1. Nonspecific nonobstructive small bowel gas pattern.  2. Moderate rectal stool burden.  3. Left basilar airspace disease.     Electronically Signed By-Suleman Fonseca MD On:1/31/2021 4:33 PM  This report was finalized on [id number] by  Suleman Fonseca MD.    XR Chest 1 View [[id number]] Collected: 01/31/21 0701     Updated: 01/31/21 0703    Narrative:      Chest one view.    DATE: 1/31/2021 at 6:34 AM.    COMPARISON: 1/31/2021 at 1:14 AM.    CLINICAL HISTORY: Hypoxia      Impression:      :    There is slight worsening of the airspace and interstitial opacity seen throughout the lungs bilaterally, left greater than right.    Endotracheal tube remains present.    The cardiac silhouette is not enlarged.    Likely small left pleural effusion and probable trace right pleural effusion.    Electronically signed by:  Maximus Kat D.O.    1/31/2021 5:02 AM    CT Chest Pulmonary Embolism [[id number]] Collected: 01/31/21 0413     Updated: 01/31/21 0419    Narrative:      Exam: CT Angiography of the Chest.    Date: 1/31/2021.     Comparison: None    History: Respiratory failure with elevated d-dimer.    Technique: CT examination of the chest was performed following the intravenous administration of 100 mL of Isovue-370. Sagittal, coronal and 3-D reformatted images were provided. CT dose lowering techniques were used, to include: automated exposure   control, adjustment for patient size, and/or use of iterative reconstruction.    FINDINGS:    Mediastinum and Libra: There is no axillary, mediastinal or hilar lymphadenopathy.    Pleural and Pericardial spaces: There are no pleural or pericardial  effusions.    Upper Abdomen: Partially visualized parapelvic cyst in the upper pole the left kidney measuring 3.1 cm in diameter. The visualized upper abdomen otherwise appears unremarkable.    Cardiovascular: Significant vascular calcification throughout the thoracic aorta without evidence of aneurysmal dilation. Mild patchy coronary artery calcifications are seen. Significant mitral valvular calcifications are identified.    Pulmonary Artery: There are no filling defects in the pulmonary arteries.    Lung Parenchyma and Airways: There is severe diffuse centrilobular emphysema. Extensive bilateral airspace opacification is seen in the lower lobes bilaterally which is suspicious for pneumonia. This is greater on the left from the right. This also   extends into the posterior aspect of the left upper lobe. Endotracheal tube appears appropriately positioned.    Bones: No fracture or aggressive osseous lesion.      Impression:        1. No evidence of pulmonary embolism.  2. No evidence of thoracic aortic aneurysm or dissection.  3. Findings in the lungs bilaterally likely relate to bilateral pneumonia. Follow to clearing recommended.  4. Severe emphysema.      Electronically signed by:  Maximus Kat D.O.    1/31/2021 2:18 AM    XR Chest 1 View [[id number]] Collected: 01/31/21 0413     Updated: 01/31/21 0416    Narrative:      Examination: AP view of the chest    Indication: Sepsis    Comparison: No prior study is available for comparison.    Findings:  The patient is intubated, the endotracheal tube tip projecting 4.4 cm from the jeremiah.    The cardiomediastinal silhouette is within normal size limits. Thoracic aortic calcifications are present.    The lungs are hyperinflated and there is flattening of the hemidiaphragms. There are increased interstitial markings throughout both lungs, with patchy opacities in the left lung base. No pneumothorax is identified.       Impression:      Impression:    1. The  endotracheal tube tip projects 4.4 cm from the jeremiah.  2. Increased interstitial markings in both lungs. This may be chronic. This could alternatively represent interstitial edema.  3. Patchy airspace disease in the left base, suspicious for pneumonia.  4. Chronic findings suggestive of COPD/emphysema.    Electronically signed by:  Dioni Tamayo M.D.    1/31/2021 2:15 AM          Cardiology      Results Review:  I have reviewed all clinical data, test, lab, and imaging results.       Schedule Meds  ampicillin-sulbactam, 3 g, Intravenous, Q8H  Beneprotein, 2 packet, Nasogastric, BID  budesonide, 0.5 mg, Nebulization, BID - RT  doxycycline, 100 mg, Intravenous, Q12H  famotidine, 20 mg, Intravenous, Q12H  insulin lispro, 0-24 Units, Subcutaneous, Q6H  ipratropium-albuterol, 3 mL, Nebulization, Q4H - RT  methylPREDNISolone sodium succinate, 60 mg, Intravenous, Q8H  sodium chloride, 3 mL, Intravenous, Q12H        Infusion Meds  custom IV infusion builder, , Last Rate: 250 mL/hr at 02/01/21 0427  DOPamine, 2-20 mcg/kg/min, Last Rate: Stopped (01/31/21 0730)  epoprostenol, 50 ng/kg/min (Ideal), Last Rate: 50 ng/kg/min (02/01/21 0951)  fentanyl 10 mcg/mL,  mcg/hr, Last Rate: 25 mcg/hr (01/31/21 2004)  heparin, 17.05 Units/kg/hr, Last Rate: 13 Units/kg/hr (02/01/21 0930)  midazolam, 1-10 mg/hr, Last Rate: 2 mg/hr (02/01/21 1004)  norepinephrine, 0.02-0.3 mcg/kg/min, Last Rate: Stopped (01/31/21 2022)  phenylephrine, 0.5-3 mcg/kg/min, Last Rate: 1 mcg/kg/min (02/01/21 1004)  propofol, 5-50 mcg/kg/min, Last Rate: Stopped (02/01/21 1004)  vasopressin, 0.03 Units/min, Last Rate: 0.03 Units/min (01/31/21 2117)        PRN Meds  •  acetaminophen **OR** acetaminophen  •  Calcium Gluconate-NaCl **AND** calcium gluconate **AND** Calcium, Ionized  •  dextrose  •  dextrose  •  fentaNYL citrate (PF)  •  glucagon (human recombinant)  •  heparin  •  heparin  •  insulin lispro **AND** insulin lispro  •  LORazepam  •  magnesium  sulfate **OR** magnesium sulfate in D5W 1g/100mL (PREMIX)  •  midazolam  •  ondansetron  •  potassium chloride **OR** potassium chloride **OR** potassium chloride  •  potassium chloride  •  potassium phosphate infusion greater than 15 mMoles **OR** potassium phosphate infusion greater than 15 mMoles **OR** potassium phosphate **OR** sodium phosphate IVPB **OR** sodium phosphate IVPB **OR** sodium phosphate IVPB  •  sodium chloride  •  sodium chloride      Assessment/Plan       Assessment    Community-acquired pneumonia with Streptococcus pneumoniae.  Urine was positive for pneumococcal antigen.  The patient has no clinical signs of meningitis prior to admission and has supple neck.  CT scan showed bilateral lower lobe infiltrates and more significant in the left side    Respiratory failure currently on 100% FiO2 with 14 PEEP    History of COPD and tobacco abuse of 3 packs of cigarettes a day.  Patient is noncompliant    Septic shock requiring 2 vasopressors secondary to pneumonia    Acute kidney injury.  Nephrology service was consulted    Plan    Discontinue current antibiotics  Start Rocephin 2 g IV daily  Supportive care and pressors  Prognosis is very guarded  Case was discussed with family at the bedside  Leonard Spencer MD  02/01/21  12:33 EST      Note is dictated utilizing voice recognition software/Dragon

## 2021-02-01 NOTE — PROGRESS NOTES
"Nutrition Services    Patient Name: Pillo Hensley  YOB: 1950  MRN: [id number]  Admission date: 1/31/2021      PPE Documentation        PPE Worn By Provider RD did not enter room for this encounter   PPE Worn By Patient  -     PROGRESS NOTE      Encounter Information: Tube feed check on. TF not yet initiated d/t risk of bowel ischemia, per discussion in rounds, plan to obtain more imaging and may consider starting later today.    Sent secure message to DOMONIQUE Read. Please consult RD if appropriate to start tube feeds       Labs (reviewed below): -Elevated BUN/Cr  -Hyperphosphatemia       GI Function:  -3 BMs documented 1/31       Nutrition Intervention: Remains NPO with concern of bowel ischemia       PO Diet/Supplements: NPO Diet   EN Prescription: -       Intake/Output:   Intake/Output Summary (Last 24 hours) at 2/1/2021 0904  Last data filed at 2/1/2021 0600  Gross per 24 hour   Intake 6771 ml   Output 765 ml   Net 6006 ml            Height: Height: 170.2 cm (67\")   Weight: Weight: 84.2 kg (185 lb 10 oz) (02/01/21 0445)   BMI: Body mass index is 29.07 kg/m².     Results from last 7 days   Lab Units 02/01/21  0432 01/31/21  0219   SODIUM mmol/L 137 147*   POTASSIUM mmol/L 4.4 2.4*   CHLORIDE mmol/L 98 117*   CO2 mmol/L 16.0* 17.0*   BUN mg/dL 31* 13   CREATININE mg/dL 2.69* 0.68*   CALCIUM mg/dL 6.6* 4.9*   BILIRUBIN mg/dL  --  <0.2   ALK PHOS U/L  --  81   ALT (SGPT) U/L  --  46*   AST (SGOT) U/L  --  60*   GLUCOSE mg/dL 319* 184*     Results from last 7 days   Lab Units 02/01/21  0432  01/31/21  0219   MAGNESIUM mg/dL 2.3  --  1.1*   PHOSPHORUS mg/dL 8.0*  --  4.2   HEMOGLOBIN g/dL 13.9  --   --    HEMATOCRIT % 43.4  --   --    TRIGLYCERIDES mg/dL 97   < >  --     < > = values in this interval not displayed.     COVID19   Date Value Ref Range Status   01/31/2021 Not Detected Not Detected - Ref. Range Final     No results found for: HGBA1C    Vitals:    02/01/21 0837   BP:    Pulse:    Resp:  "   Temp: 96.8 °F (36 °C)   SpO2:        RD to follow up per protocol.    Electronically signed by:  Adrienne Tillman RD  02/01/21 09:04 EST

## 2021-02-01 NOTE — PROGRESS NOTES
RENAL/[facility]:    Consult received, chart reviewed, full note to follow.    Willard Nixon M.D.  [facility]

## 2021-02-02 PROBLEM — K72.00 ACUTE LIVER FAILURE: Status: ACTIVE | Noted: 2021-01-01

## 2021-02-02 NOTE — PROGRESS NOTES
PULMONARY/CRITICAL CARE PROGRESS NOTE         Name:  Pillo Hensley  Age: 70 y.o.  Sex:  male  :   1950  MRN:  [id number]     ROOM:  [facility] ICU 2303/     ASSESSMENT & PLAN     F/U: Acute respiratory failure    Active Problems:    Acute respiratory failure with hypercapnia (CMS/HCC)    Septic shock (CMS/HCC)    Hypokalemia    Hypocalcemia    Hypomagnesemia    Elevated d-dimer    Elevated troponin    Elevated brain natriuretic peptide (BNP) level    Hyperglycemia    PNA (pneumonia)    UTI (urinary tract infection)       Multidisciplinary Rounds:  -Discussed with patient's family this AM, patient has been transitioned to No CPR, Comfort measures.  Drip discontinuation and Extubation once family ready.  - to come and pray with patient's family.  -Emotional support provided.    Assessment and plan:  Acute respiratory failure with hypercapnia and hypoxia (CMS/HCC)  -Intubated in the ED  -vent settings and ABGs reviewed.  Cont to adjust vent as appropriate   -wean FIO2 for sats 92%  -pulmicort and duonebs   -CXR and CT PE study report reviewed, severe emphysema, no PE   -Continue Flolan inhaled  -Planned extubation     PNA, community-acquired pneumonia with Streptococcus pneumoniae  -cont with abx of Rocephin and Doxy   -pulmicort and duonebs   -sputum cx ordered  -urine antigen for Legionella negative  -Urine antigen for Streptococcus pneumoniae positive     Septic shock, etiology pneumonia  Strep pneumoniae antigen positive  Multi-organ failure  Acute liver dysfunction  -s/p IV fluids of 30 ml/kg   -lactate 8.8 then 6.5, cont to monitor Q 4 hours until normal  -check procal and sputum cx  -UA reviewed and did not reflex  -blood cx pending  -Initially received empiric abx given of Rocephin and Azithromycin in ED.  Cont with Rocephin and Doxy   -Consult ID for antibiotic management  -Now on ceftriaxone monotherapy  -Antibioitcs have been discontinued, comfort measures     ARVIND  -Likely ATN secondary  to septic shock  -Consult nephrology  -Planned CRRT, did not tolerate  -CRRT efforts discontinued, now comfort measures    Elevated d-dimer  -D-Dimer 9.51, CT PE study negative for PE, but did reflect bilateral PNA, severe emphysema  -Previously on Heparin     Elevated troponin/NSTEMI  -troponin 1.030, cont to monitor Q 6 hours   -cardiology consulted  -ECHO reviewed, EF 61 to 65%, severe mitral valve regurgitation, RV cavity mildly dilated  -lipid panel ordered  -Heparin drip started, now discontinued  -Comfort measures     Elevated brain natriuretic peptide (BNP) level  -pro BNP 2,102  -ECHO reviewed as above      Hyperglycemia  -SSI  -Hgb A1c 5.7     UTI  -abx as above   -follow urine cx     Tobacco abuse, current every day smoker on admission up to 3 packs/day  Medical noncompliance    PICC  Shiley HD catheter  Arterial line  F/C     Code Status: No CPR, comfort measures  VTE Prophylaxis: None, comfort measures  PUD Prophylaxis: None indicated     Awaiting family readiness for withdrawal of artificial means of life support.    [place] & SUBJECTIVE     Pillo MARIA L Hensley is a 70 y.o. male who presented to the ED by way of EMS on 1/31/2021 with complaint of respiratory distress.  EMS used bag valve mask in route to the ED and upon arrival he was intubated.  Sepsis protocol was initiated and he was given an IV fluid bolus of 30 ml/kg.  Blood cultures sent.  Antibiotics administered of Rocephin and Azithromycin.   Respiratory viral panel with COVID - 19 was negative.  EKG showed some possible ST elevation inferiorly and cardiology was called who reviewed the EKG, but the cardiac catheterization lab was not activated at this time.  He was started on a Heparin drip.  CXR showed increased interstitial markings and chronic findings suggestive of COPD/Emphysema.  CT PE study was negative for PE, but did reflect bilateral PNA, severe emphysema.  In the ED, labs were obtained with abnormalities as follows: troponin 1.030, pro  "BNP 2,102, , Glucose 184, , K+ 2.4, , CO2 17, Chloride 117, Calcium 4.9, Ionized calcium 1.15, total protein 3.4, ALT 46, AST 60, Albumin 1.80, CRP 1.10, lactate 8.8, magnesium 1.1, and D-Dimer 9.51.  Urinalysis was dark and cloudy with positive glucose, blood, leukocytes, and protein.  Urine cx pending.  Pulmonary/Intensivist service was contacted for admission to ICU and further evaluation and treatment.       Information for this HPI is limited as the patient is intubated and sedated.  Information obtained by chart review and ED provider.    REVIEW OF SYSTEMS:  Unable to assess due to pt condition    Daily progression:  2/1: No acute events overnight.  Intubated and sedated.  Continues on inhaled Flolan.  Multiple pressor support with tash and vaso-.  On a bicarb drip and heparin drip  2/2: Did not tolerate CRRT overnight.  Intubated, unresponsive, off sedation, on 4 vasopressors.  Planned comfort measures.    MEDICATIONS     SCHEDULED MEDICATIONS:   sodium chloride, 3 mL, Intravenous, Q12H        CONTINUOUS INFUSIONS:   DOPamine, 2-20 mcg/kg/min, Last Rate: Stopped (02/02/21 0700)  epoprostenol, 50 ng/kg/min (Ideal), Last Rate: 50 ng/kg/min (02/02/21 0742)  fentanyl 10 mcg/mL,  mcg/hr, Last Rate: 12.5 mcg/hr (02/02/21 0527)  norepinephrine, 0.02-0.3 mcg/kg/min, Last Rate: 0.5 mcg/kg/min (02/02/21 0646)  phenylephrine, 0.5-3 mcg/kg/min, Last Rate: 6 mcg/kg/min (02/02/21 0525)  vasopressin, 0.03 Units/min, Last Rate: 0.03 Units/min (02/02/21 0100)        PRN MEDS:  •  acetaminophen **OR** acetaminophen **OR** acetaminophen  •  atropine  •  carboxymethylcellulose sod  •  diphenoxylate-atropine  •  glycopyrrolate  •  LORazepam  •  LORazepam  •  Morphine  •  Scopolamine    OBJECTIVE     VITAL SIGNS:  BP 98/69   Pulse (!) 133   Temp 99.3 °F (37.4 °C) (Bladder)   Resp 28   Ht 170.2 cm (67\")   Wt 84 kg (185 lb 3 oz)   SpO2 95%   BMI 29.00 kg/m²     I/O FROM PREVIOUS 3 SHIFTS:  I/O last 3 " completed shifts:  In: 8446 [I.V.:8446]  Out: 588 [Urine:210; Emesis/NG output:225; Other:153]    I/O THIS SHIFT:  No intake/output data recorded.    PHYSICAL EXAM:  General Appearance:  Intubated and unresponsive. NAD.  Appears acutely and chronically ill  Head:  Normocephalic, without obvious abnormality, atraumatic. OETT.  NG tube  Eyes:  Pupils fixed and dilated, conjunctiva/corneas clear, EOM's cannot be assessed    Neck:  No JVD, trachea midline  Lungs:   Coarse throughout, scattered rhonchi, mild wheezing, mechanically ventilated   Chest wall:  Symmetrical movement  Heart:  Irregular rate and rhythm, S1 and S2 normal, + ASIF, rub or gallop  Abdomen: Distended, hypoactive bowel sounds, firm  Extremities:  Extremities normal, no cyanosis or edema  Pulses: 1+ and symmetric all extremities  Skin:  No rashes or lesions  Neurologic:   Intubated, unresponsive.      RESULTS     LABS:  Lab Results (last 24 hours)     Procedure Component Value Units Date/Time    Respiratory Culture - Sputum, ET Suction [[id number]] Collected: 01/31/21 0511    Specimen: Sputum from ET Suction Updated: 02/02/21 0721     Respiratory Culture Scant growth (1+) Normal Respiratory Cely: NO S.aureus/MRSA or Pseudomonas aeruginosa     Gram Stain Moderate (3+) WBCs per low power field      Rare (1+) Epithelial cells per low power field      Few (2+) Mixed bacterial morphotypes seen on Gram Stain    Renal Function Panel [[id number]]  (Abnormal) Collected: 02/02/21 0519    Specimen: Blood Updated: 02/02/21 0555     Glucose 184 mg/dL      BUN 38 mg/dL      Creatinine 3.76 mg/dL      Sodium 137 mmol/L      Potassium 4.6 mmol/L      Chloride 94 mmol/L      CO2 21.0 mmol/L      Calcium 6.2 mg/dL      Albumin 2.40 g/dL      Phosphorus 8.3 mg/dL      Anion Gap 22.0 mmol/L      BUN/Creatinine Ratio 10.1     eGFR Non African Amer 16 mL/min/1.73     Narrative:      GFR Normal >60  Chronic Kidney Disease <60  Kidney Failure <15      Magnesium [[id number]]   (Normal) Collected: 02/02/21 0519    Specimen: Blood Updated: 02/02/21 0555     Magnesium 2.2 mg/dL     Calcium, Ionized [[id number]]  (Abnormal) Collected: 02/02/21 0519    Specimen: Blood Updated: 02/02/21 0552     Ionized Calcium 0.81 mmol/L     aPTT [[id number]]  (Abnormal) Collected: 02/02/21 0519    Specimen: Blood Updated: 02/02/21 0539     PTT 57.0 seconds     POC Glucose Once [[id number]]  (Abnormal) Collected: 02/02/21 0519    Specimen: Blood Updated: 02/02/21 0520     Glucose 183 mg/dL      Comment: Serial Number: [id number]:  [id number]       Blood Gas, Arterial - [[id number]]  (Abnormal) Collected: 02/02/21 0408    Specimen: Arterial Blood Updated: 02/02/21 0418     Site Arterial Line     Nilson's Test N/A     pH, Arterial 7.114 pH units      pCO2, Arterial 71.5 mm Hg      pO2, Arterial 80.2 mm Hg      HCO3, Arterial 22.9 mmol/L      Base Excess, Arterial -8.1 mmol/L      Comment: Serial Number: [id number]:  [id number]        O2 Saturation, Arterial 90.1 %      CO2 Content 25.1 mmol/L      Barometric Pressure for Blood Gas --     Comment: N/A        Modality Adult Vent     FIO2 100 %      Ventilator Mode ;AC     Set Tidal Volume 550     PEEP 14     Hemodilution No     Respiratory Rate 28    Comprehensive Metabolic Panel [[id number]]  (Abnormal) Collected: 02/02/21 0140    Specimen: Blood Updated: 02/02/21 0217     Glucose 198 mg/dL      BUN 34 mg/dL      Creatinine 3.11 mg/dL      Sodium 138 mmol/L      Potassium 4.4 mmol/L      Chloride 97 mmol/L      CO2 21.0 mmol/L      Calcium 6.4 mg/dL      Total Protein 4.5 g/dL      Albumin 2.30 g/dL      ALT (SGPT) >7,000 U/L      AST (SGOT) >7,000 U/L      Alkaline Phosphatase 139 U/L      Total Bilirubin 1.1 mg/dL      eGFR Non African Amer 20 mL/min/1.73      Globulin 2.2 gm/dL      A/G Ratio 1.0 g/dL      BUN/Creatinine Ratio 10.9     Anion Gap 20.0 mmol/L     Narrative:      GFR Normal >60  Chronic Kidney Disease <60  Kidney Failure <15      Calcium, Ionized  [[id number]]  (Abnormal) Collected: 02/02/21 0140    Specimen: Blood Updated: 02/02/21 0210     Ionized Calcium 0.86 mmol/L     Magnesium [[id number]]  (Normal) Collected: 02/02/21 0140    Specimen: Blood Updated: 02/02/21 0202     Magnesium 2.1 mg/dL     Phosphorus [[id number]]  (Abnormal) Collected: 02/02/21 0140    Specimen: Blood Updated: 02/02/21 0202     Phosphorus 7.9 mg/dL     Blood Culture - Blood, Arm, Right [[id number]] Collected: 01/31/21 0141    Specimen: Blood from Arm, Right Updated: 02/02/21 0200     Blood Culture No growth at 2 days    Blood Culture - Blood, Arm, Left [[id number]] Collected: 01/31/21 0141    Specimen: Blood from Arm, Left Updated: 02/02/21 0200     Blood Culture No growth at 2 days    CBC & Differential [[id number]]  (Abnormal) Collected: 02/02/21 0140    Specimen: Blood Updated: 02/02/21 0145    Narrative:      The following orders were created for panel order CBC & Differential.  Procedure                               Abnormality         Status                     ---------                               -----------         ------                     CBC Auto Differential[[id number]]        Abnormal            Final result                 Please view results for these tests on the individual orders.    CBC Auto Differential [[id number]]  (Abnormal) Collected: 02/02/21 0140    Specimen: Blood Updated: 02/02/21 0145     WBC 13.50 10*3/mm3      RBC 4.57 10*6/mm3      Hemoglobin 13.6 g/dL      Hematocrit 42.4 %      MCV 92.8 fL      MCH 29.7 pg      MCHC 32.0 g/dL      RDW 16.4 %      RDW-SD 54.7 fl      MPV 8.7 fL      Platelets 118 10*3/mm3      Neutrophil % 94.2 %      Lymphocyte % 3.4 %      Monocyte % 1.7 %      Eosinophil % 0.2 %      Basophil % 0.5 %      Neutrophils, Absolute 12.70 10*3/mm3      Lymphocytes, Absolute 0.50 10*3/mm3      Monocytes, Absolute 0.20 10*3/mm3      Eosinophils, Absolute 0.00 10*3/mm3      Basophils, Absolute 0.10 10*3/mm3      nRBC 0.3 /100 WBC     POC  Glucose Once [[id number]]  (Abnormal) Collected: 02/02/21 0144    Specimen: Blood Updated: 02/02/21 0145     Glucose 186 mg/dL      Comment: Serial Number: [id number]:  [id number]       Blood Gas, Arterial - [[id number]]  (Abnormal) Collected: 02/01/21 1742    Specimen: Arterial Blood Updated: 02/01/21 1746     Site Arterial Line     Nilson's Test N/A     pH, Arterial 7.222 pH units      pCO2, Arterial 50.6 mm Hg      pO2, Arterial 77.8 mm Hg      HCO3, Arterial 20.8 mmol/L      Base Excess, Arterial -7.3 mmol/L      Comment: Serial Number: [id number]:  [id number]        O2 Saturation, Arterial 92.3 %      CO2 Content 22.4 mmol/L      Barometric Pressure for Blood Gas --     Comment: N/A        Modality Adult Vent     FIO2 100 %      Ventilator Mode ;BiLevel     Set Tidal Volume 550     PEEP 14     Hemodilution No     Respiratory Rate 28    POC Glucose Once [[id number]]  (Abnormal) Collected: 02/01/21 1724    Specimen: Blood Updated: 02/01/21 1726     Glucose 235 mg/dL      Comment: Serial Number: [id number]:  [id number]       Calcium, Ionized [[id number]]  (Abnormal) Collected: 02/01/21 1614    Specimen: Blood Updated: 02/01/21 1655     Ionized Calcium 0.85 mmol/L     Renal Function Panel [[id number]]  (Abnormal) Collected: 02/01/21 1614    Specimen: Blood Updated: 02/01/21 1641     Glucose 303 mg/dL      BUN 37 mg/dL      Creatinine 3.38 mg/dL      Sodium 138 mmol/L      Potassium 4.4 mmol/L      Comment: Slight hemolysis detected by analyzer. Results may be affected.        Chloride 93 mmol/L      CO2 19.0 mmol/L      Calcium 6.5 mg/dL      Albumin 2.30 g/dL      Phosphorus 8.1 mg/dL      Anion Gap 26.0 mmol/L      BUN/Creatinine Ratio 10.9     eGFR Non African Amer 18 mL/min/1.73     Narrative:      GFR Normal >60  Chronic Kidney Disease <60  Kidney Failure <15      Magnesium [[id number]]  (Normal) Collected: 02/01/21 1614    Specimen: Blood Updated: 02/01/21 1641     Magnesium 2.2 mg/dL     POC Glucose Once  [[id number]]  (Abnormal) Collected: 02/01/21 1202    Specimen: Blood Updated: 02/01/21 1215     Glucose 273 mg/dL      Comment: Serial Number: [id number]:  [id number]       Hemoglobin A1c [[id number]]  (Abnormal) Collected: 01/31/21 0141    Specimen: Blood from Arm, Right Updated: 02/01/21 0951     Hemoglobin A1C 5.7 %     Narrative:      Hemoglobin A1C Reference Range:    <5.7 %        Normal  5.7-6.4 %     Increased risk for diabetes  > 6.4 %        Diabetes       These guidelines have been recommended by the American Diabetic Association for Hgb A1c.      The following 2010 guidelines have been recommended by the American Diabetes Association for Hemoglobin A1c.    HBA1c 5.7-6.4% Increased risk for future diabetes (pre-diabetes)  HBA1c     >6.4% Diabetes      Urine Culture - Urine, Urine, Catheter [[id number]]  (Normal) Collected: 01/31/21 0407    Specimen: Urine, Catheter Updated: 02/01/21 0933     Urine Culture No growth           RADIOLOGY:  Xr Chest 1 View    Result Date: 2/1/2021  EXAMINATION: XR CHEST 1 VW DATE: 2/1/2021 11:27 PM INDICATION:  Shortness of breath; intubation COMPARISON:  6:21 PM same day     1.  Improved bibasilar pulmonary opacities since radiograph earlier today. 2.  No other interval change. Electronically signed by:  Ale Nolan M.D.  2/1/2021 9:52 PM    Xr Chest 1 View    Result Date: 2/1/2021  Examination: XR CHEST 1 VW-  Date of Exam: 2/1/2021 6:25 PM  Indication: line placement; J96.02-Acute respiratory failure with hypercapnia; A41.9-Sepsis, unspecified organism; R65.21-Severe sepsis with septic shock; R77.8-Other specified abnormalities of plasma proteins; R79.89-Other specified abnormal findings of blood chemistry; E87.6-Hypokalemia.   Comparison: 02/01/2021, 4:35 AM  Technique: 1 view of the chest  FINDINGS: The ET tube tip remains in good position. The NG tube tip is not included. Right-sided PICC line tip is in the SVC. A new right IJ catheter has been placed, the tip in  the area of the upper SVC.  The heart size is within normal. Mediastinal contours are unremarkable. There our bilateral interstitial and airspace opacities, greater on left than right. There is relative upper lung sparing. Small left effusion is possible. No pneumothorax.      1. Right IJ catheter tip is in the area of the superior vena cava. Negative for pneumothorax. 2. No significant change in the extensive bilateral interstitial and airspace opacities.  Electronically Signed By-Jazmyn Auguste MD On:2/1/2021 6:54 PM This report was finalized on [id number] by  Jazmyn Auguste MD.      ECHOCARDIOGRAM:  Results for orders placed during the hospital encounter of 01/31/21   Adult Transthoracic Echo Complete W/ Cont if Necessary Per Protocol    Narrative · Left ventricular ejection fraction appears to be 61 - 65%.  · Severe mitral valve regurgitation is present.  · The right ventricular cavity is mildly dilated.  · No pericardial effusion noted           I reviewed the patient's new clinical results.    This note has been scribed by me for pulmonary attending physician.    Electronically signed by CLAYTON Schumacher, 02/02/21 at 08:24 EST.     Poor long term prognosis agree with comfortcare    I personally have examined  and interviewed the patient. I have reviewed the history, data, problems, assessment and plan with our NP.  Critical care time in direct medical management (   ) minutes   Efrain Alvares MD, D,ABSM  2/2/2021

## 2021-02-02 NOTE — SIGNIFICANT NOTE
Conversation with patient's family per their request.  They have discussed as a family and are ready to proceed with comfort measures.  All questions were answered to their satisfaction.  Code status and orders have been corrected and entered accordingly.  Emotional support provided.  Offered  services, which was appreciated and requested.  Nursing will discuss with .  Discussed with Dr. Alvares, who is agreement with this plan of care.    Electronically signed by CLAYTON Schumacher, 02/02/21 at 08:24 EST.

## 2021-02-02 NOTE — NURSING NOTE
CRRT stopped. Pt no longer tolerating. New art line will be replaced and Renal will be called. Intensivist NP at bedside.

## 2021-02-02 NOTE — PROCEDURES
"Insert Arterial Line    Date/Time: 2/2/2021 12:05 AM  Performed by: Celeste Phoenix APRN  Authorized by: Celeste Phoenix APRN   Consent: The procedure was performed in an emergent situation.  Patient identity confirmed: arm band, provided demographic data and hospital-assigned identification number  Time out: Immediately prior to procedure a \"time out\" was called to verify the correct patient, procedure, equipment, support staff and site/side marked as required.  Preparation: Patient was prepped and draped in the usual sterile fashion.  Indications: multiple ABGs, respiratory failure and hemodynamic monitoring  Location: right femoral  Anesthesia: local infiltration    Anesthesia:  Local Anesthetic: lidocaine 1% without epinephrine  Needle gauge: 18  Seldinger technique: Seldinger technique used  Number of attempts: 2  Post-procedure: line sutured and dressing applied  Post-procedure CMS: normal and unchanged  Patient tolerance: patient tolerated the procedure well with no immediate complications  Comments: The patient is on multiple vasopressors and existing arterial stopped working.  Unable to obtain a cuff pressure            Electronically signed by CLAYTON Washington, 02/02/21, 1:07 AM EST.    Procedure and note  was supervised /reviewed with needed  addendum   Efrain Alvares MD, D,ABSM  2/2/2021   "

## 2021-02-02 NOTE — DISCHARGE SUMMARY
PULMONARY/CRITICAL CARE DEATH SUMMARY    PATIENT NAME:     Pillo Hensley  :     1950  MRN:     [id number]    ADMISSION DATE:  2021      DATE/TIME OF DEATH:    2021 at 9:30 AM     SMOKING STATUS:  Current Every Day Smoker      CAUSE OF DEATH  Septic shock with multi organ failure, secondary to acute urinary tract infection and pneumonia, secondary to strep pneumoniae; complicated by non-STEMI, shock liver, and acute kidney injury secondary to acute tubular necrosis      FINAL DIAGNOSES  Acute respiratory failure with hypercapnia and hypoxia (CMS/HCC)  PNA, community-acquired pneumonia with Streptococcus pneumoniae  Septic shock, etiology pneumonia  Strep pneumoniae antigen positive  Multi-organ failure  Acute liver dysfunction, likely shock liver  ARVIND, secondary to ATN  Elevated d-dimer  Elevated troponin/NSTEMI  Elevated brain natriuretic peptide (BNP) level  Hyperglycemia  UTI  Tobacco abuse, current every day smoker on admission up to 3 packs/day  Medical noncompliance       ADMISSION DIAGNOSES/PRESENTING PROBLEMS  Acute respiratory failure with hypercapnia and hypoxia (CMS/HCC)  PNA  Septic shock (CMS/HCC)  Hypokalemia  Hypocalcemia  Hypomagnesemia  Elevated d-dimer  Elevated troponin  Elevated brain natriuretic peptide (BNP) level  Hyperglycemia  UTI       HOSPITAL COURSE  Pillo Hensley was a 70 y.o. male who presented to the ED by way of EMS on 2021 with complaint of respiratory distress.  EMS used bag valve mask in route to the ED and upon arrival he was intubated.  Sepsis protocol was initiated and he was given an IV fluid bolus of 30 ml/kg.  Blood cultures sent.  Antibiotics administered of Rocephin and Azithromycin.   Respiratory viral panel with COVID - 19 was negative.  EKG showed some possible ST elevation inferiorly and cardiology was called who reviewed the EKG, but the cardiac catheterization lab was not activated at this time.  He was started on a Heparin drip.  CXR  showed increased interstitial markings and chronic findings suggestive of COPD/Emphysema.  CT PE study was negative for PE, but did reflect bilateral PNA, severe emphysema.  In the ED, labs were obtained with abnormalities as follows: troponin 1.030, pro BNP 2,102, , Glucose 184, , K+ 2.4, , CO2 17, Chloride 117, Calcium 4.9, Ionized calcium 1.15, total protein 3.4, ALT 46, AST 60, Albumin 1.80, CRP 1.10, lactate 8.8, magnesium 1.1, and D-Dimer 9.51.  Urinalysis was dark and cloudy with positive glucose, blood, leukocytes, and protein.  Urine cx pending.  Pulmonary/Intensivist service was contacted for admission to ICU and further evaluation and treatment.      During patient's hospitalization, nephrology, infectious disease, as well as cardiology were consulted.  Patient was treated for pneumonia that was ultimately discovered to be secondary to Streptococcus pneumoniae infection as patient was antigen positive.  Patient was in septic shock, with ultimate and multi organ dysfunction.  Patient was started on vasopressors, requiring 4 different vasopressors, with continuing declining blood pressures.  Patient remained unresponsive though his sedation was discontinued.  Patient also was attempted to be placed on continuous renal replacement therapy, but due to worsening hypotension, was unable to tolerate, requiring blood to be returned.  Patient was also found to be likely with a non-STEMI, but initially was started on heparin until it had been discontinued by cardiology.  Overnight, on 2/1/2021 into the early a.m. of 2/2/2021, patient began to worsen, and family were called in to be with the patient.  Patient remained with very poor prognosis, was unresponsive to antibiotics and treatment, and was chronically ill with a history of medical noncompliance that was reported as well as generally not going to the doctor for treatment or evaluation.  Due to patient's comorbid conditions, he was worsened by his  acute injuries related to his severe sepsis and septic shock.  On the morning of 2/2/2021, after family had discussed amongst themselves, decision was made to withdraw artificial means of life support and to proceed with comfort measures.  Emotional support was provided,  was contacted to come and pray with patient's family upon their request.  Appropriate orders were placed per comfort measures protocol.  Time of death noted to be on 2/2/2021 at 9:30 AM .  Emotional support provided to family.       PROCEDURES PERFORMED  02/02 0005 Insert Arterial Line  01/31 1444 Note By: Umm Rogel APRN insertion of Shiley catheter  1/31 PICC line placement  1/31 intubation in ED    CONSULTING PHYSICIANS  Consults     Date and Time Order Name Status Description    2/1/2021 0913 Inpatient Infectious Diseases Consult Completed     2/1/2021 0913 Inpatient Nephrology Consult Completed     1/31/2021 0512 Inpatient Cardiology Consult Completed           RESULTS REVIEW  LABS  Lab Results (last 24 hours)     Procedure Component Value Units Date/Time    Magnesium [[id number]]  (Normal) Collected: 02/02/21 0140    Specimen: Blood Updated: 02/02/21 0202     Magnesium 2.1 mg/dL     Calcium, Ionized [[id number]]  (Abnormal) Collected: 02/02/21 0140    Specimen: Blood Updated: 02/02/21 0210     Ionized Calcium 0.86 mmol/L     CBC & Differential [[id number]]  (Abnormal) Collected: 02/02/21 0140    Specimen: Blood Updated: 02/02/21 0145    Narrative:      The following orders were created for panel order CBC & Differential.  Procedure                               Abnormality         Status                     ---------                               -----------         ------                     CBC Auto Differential[[id number]]        Abnormal            Final result                 Please view results for these tests on the individual orders.    Comprehensive Metabolic Panel [[id number]]  (Abnormal) Collected: 02/02/21 0140    Specimen:  Blood Updated: 02/02/21 0217     Glucose 198 mg/dL      BUN 34 mg/dL      Creatinine 3.11 mg/dL      Sodium 138 mmol/L      Potassium 4.4 mmol/L      Chloride 97 mmol/L      CO2 21.0 mmol/L      Calcium 6.4 mg/dL      Total Protein 4.5 g/dL      Albumin 2.30 g/dL      ALT (SGPT) >7,000 U/L      AST (SGOT) >7,000 U/L      Alkaline Phosphatase 139 U/L      Total Bilirubin 1.1 mg/dL      eGFR Non African Amer 20 mL/min/1.73      Globulin 2.2 gm/dL      A/G Ratio 1.0 g/dL      BUN/Creatinine Ratio 10.9     Anion Gap 20.0 mmol/L     Narrative:      GFR Normal >60  Chronic Kidney Disease <60  Kidney Failure <15      CBC Auto Differential [[id number]]  (Abnormal) Collected: 02/02/21 0140    Specimen: Blood Updated: 02/02/21 0145     WBC 13.50 10*3/mm3      RBC 4.57 10*6/mm3      Hemoglobin 13.6 g/dL      Hematocrit 42.4 %      MCV 92.8 fL      MCH 29.7 pg      MCHC 32.0 g/dL      RDW 16.4 %      RDW-SD 54.7 fl      MPV 8.7 fL      Platelets 118 10*3/mm3      Neutrophil % 94.2 %      Lymphocyte % 3.4 %      Monocyte % 1.7 %      Eosinophil % 0.2 %      Basophil % 0.5 %      Neutrophils, Absolute 12.70 10*3/mm3      Lymphocytes, Absolute 0.50 10*3/mm3      Monocytes, Absolute 0.20 10*3/mm3      Eosinophils, Absolute 0.00 10*3/mm3      Basophils, Absolute 0.10 10*3/mm3      nRBC 0.3 /100 WBC     Phosphorus [[id number]]  (Abnormal) Collected: 02/02/21 0140    Specimen: Blood Updated: 02/02/21 0202     Phosphorus 7.9 mg/dL     POC Glucose Once [[id number]]  (Abnormal) Collected: 02/02/21 0144    Specimen: Blood Updated: 02/02/21 0145     Glucose 186 mg/dL      Comment: Serial Number: [id number]:  [id number]       Blood Gas, Arterial - [[id number]]  (Abnormal) Collected: 02/02/21 0408    Specimen: Arterial Blood Updated: 02/02/21 0418     Site Arterial Line     Nilosn's Test N/A     pH, Arterial 7.114 pH units      pCO2, Arterial 71.5 mm Hg      pO2, Arterial 80.2 mm Hg      HCO3, Arterial 22.9 mmol/L      Base Excess, Arterial  -8.1 mmol/L      Comment: Serial Number: [id number]:  [id number]        O2 Saturation, Arterial 90.1 %      CO2 Content 25.1 mmol/L      Barometric Pressure for Blood Gas --     Comment: N/A        Modality Adult Vent     FIO2 100 %      Ventilator Mode ;AC     Set Tidal Volume 550     PEEP 14     Hemodilution No     Respiratory Rate 28    aPTT [[id number]]  (Abnormal) Collected: 02/02/21 0519    Specimen: Blood Updated: 02/02/21 0539     PTT 57.0 seconds     Renal Function Panel [[id number]]  (Abnormal) Collected: 02/02/21 0519    Specimen: Blood Updated: 02/02/21 0555     Glucose 184 mg/dL      BUN 38 mg/dL      Creatinine 3.76 mg/dL      Sodium 137 mmol/L      Potassium 4.6 mmol/L      Chloride 94 mmol/L      CO2 21.0 mmol/L      Calcium 6.2 mg/dL      Albumin 2.40 g/dL      Phosphorus 8.3 mg/dL      Anion Gap 22.0 mmol/L      BUN/Creatinine Ratio 10.1     eGFR Non African Amer 16 mL/min/1.73     Narrative:      GFR Normal >60  Chronic Kidney Disease <60  Kidney Failure <15      Magnesium [[id number]]  (Normal) Collected: 02/02/21 0519    Specimen: Blood Updated: 02/02/21 0555     Magnesium 2.2 mg/dL     Calcium, Ionized [[id number]]  (Abnormal) Collected: 02/02/21 0519    Specimen: Blood Updated: 02/02/21 0552     Ionized Calcium 0.81 mmol/L     POC Glucose Once [[id number]]  (Abnormal) Collected: 02/02/21 0519    Specimen: Blood Updated: 02/02/21 0520     Glucose 183 mg/dL      Comment: Serial Number: [id number]:  [id number]             MICRO:  All cultures reviewed and negative, or pending with no growth unless otherwise designated in chart below.  Microbiology Results Abnormal     Procedure Component Value - Date/Time    Respiratory Culture - Sputum, ET Suction [[id number]] Collected: 01/31/21 0511    Lab Status: Final result Specimen: Sputum from ET Suction Updated: 02/02/21 0721     Respiratory Culture Scant growth (1+) Normal Respiratory Cely: NO S.aureus/MRSA or Pseudomonas aeruginosa     Gram Stain  Moderate (3+) WBCs per low power field      Rare (1+) Epithelial cells per low power field      Few (2+) Mixed bacterial morphotypes seen on Gram Stain    Blood Culture - Blood, Arm, Right [[id number]] Collected: 01/31/21 0141    Lab Status: Preliminary result Specimen: Blood from Arm, Right Updated: 02/02/21 0200     Blood Culture No growth at 2 days    Blood Culture - Blood, Arm, Left [[id number]] Collected: 01/31/21 0141    Lab Status: Preliminary result Specimen: Blood from Arm, Left Updated: 02/02/21 0200     Blood Culture No growth at 2 days    Urine Culture - Urine, Urine, Catheter [[id number]]  (Normal) Collected: 01/31/21 0407    Lab Status: Final result Specimen: Urine, Catheter Updated: 02/01/21 0933     Urine Culture No growth    MRSA Screen, PCR (Inpatient) - Swab, Nares [[id number]]  (Normal) Collected: 01/31/21 0511    Lab Status: Final result Specimen: Swab from Nares Updated: 01/31/21 0637     MRSA PCR No MRSA Detected    Legionella Antigen, Urine - Urine, Urine, Clean Catch [[id number]]  (Normal) Collected: 01/31/21 0512    Lab Status: Final result Specimen: Urine, Clean Catch Updated: 01/31/21 0535     LEGIONELLA ANTIGEN, URINE Negative    S. Pneumo Ag Urine or CSF - Urine, Urine, Clean Catch [[id number]]  (Abnormal) Collected: 01/31/21 0512    Lab Status: Final result Specimen: Urine, Clean Catch Updated: 01/31/21 0532     Strep Pneumo Ag Positive    Respiratory Panel PCR w/COVID-19(SARS-CoV-2) [facility] In-House, NP Swab in UTM/VTM, 3-4 HR TAT - Swab, Nasopharynx [[id number]]  (Normal) Collected: 01/31/21 0213    Lab Status: Final result Specimen: Swab from Nasopharynx Updated: 01/31/21 0309     ADENOVIRUS, PCR Not Detected     Coronavirus 229E Not Detected     Coronavirus HKU1 Not Detected     Coronavirus NL63 Not Detected     Coronavirus OC43 Not Detected     COVID19 Not Detected     Human Metapneumovirus Not Detected     Human Rhinovirus/Enterovirus Not Detected     Influenza A PCR  Not Detected     Influenza B PCR Not Detected     Parainfluenza Virus 1 Not Detected     Parainfluenza Virus 2 Not Detected     Parainfluenza Virus 3 Not Detected     Parainfluenza Virus 4 Not Detected     RSV, PCR Not Detected     Bordetella pertussis pcr Not Detected     Bordetella parapertussis PCR Not Detected     Chlamydophila pneumoniae PCR Not Detected     Mycoplasma pneumo by PCR Not Detected    Narrative:      Fact sheet for providers: https://docs.[employer]/wp-content/uploads/BIQ7292-5981-OU3.1-EUA-Provider-Fact-Sheet-3.pdf    Fact sheet for patients: https://docs.[employer]/wp-content/uploads/LHS8658-2318-PW2.1-EUA-Patient-Fact-Sheet-1.pdf    Test performed by PCR.         MICROBIOLOGY RESULTS:  The following results have been reviewed, are positive, or pending with no growth unless otherwise documented, which is at this time of documentation:  Collected Updated Procedure Result Status    01/31/2021 0512 01/31/2021 0535 Legionella Antigen, Urine - Urine, Urine, Clean Catch [[id number]]   Urine, Clean Catch    Final result Component Value   L. pneumophila Serogp 1 Ur Ag Negative              01/31/2021 0512 01/31/2021 0532 S. Pneumo Ag Urine or CSF - Urine, Urine, Clean Catch [[id number]]   (Abnormal)   Urine, Clean Catch    Final result Component Value   Strep Pneumo Ag PositiveAbnormal               01/31/2021 0511 01/31/2021 0637 MRSA Screen, PCR (Inpatient) - Swab, Nares [[id number]]   Swab from Nares    Final result Component Value   MRSA PCR No MRSA Detected              01/31/2021 0511 02/02/2021 0721 Respiratory Culture - Sputum, ET Suction [[id number]]   Sputum from ET Suction    Final result Component Value   Respiratory Culture Scant growth (1+) Normal Respiratory Cely: NO S.aureus/MRSA or Pseudomonas aeruginosa   Gram Stain Moderate (3+) WBCs per low power field    Rare (1+) Epithelial cells per low power field    Few (2+) Mixed bacterial morphotypes seen on Gram Stain                 01/31/2021 0407 02/01/2021 0933 Urine Culture - Urine, Urine, Catheter [[id number]]   Urine, Catheter    Final result Component Value   Urine Culture No growth              01/31/2021 0213 01/31/2021 0309 Respiratory Panel PCR w/COVID-19(SARS-CoV-2) [facility]ESH/[facility] In-House, NP Swab in UTM/VTM, 3-4 HR TAT - Swab, Nasopharynx [[id number]]    Swab from Nasopharynx    Final result Component Value   ADENOVIRUS, PCR Not Detected   Coronavirus 229E Not Detected   Coronavirus HKU1 Not Detected   Coronavirus NL63 Not Detected   Coronavirus OC43 Not Detected   COVID19 Not Detected   Human Metapneumovirus Not Detected   Human Rhinovirus/Enterovirus Not Detected   Influenza A PCR Not Detected   Influenza B PCR Not Detected   Parainfluenza Virus 1 Not Detected   Parainfluenza Virus 2 Not Detected   Parainfluenza Virus 3 Not Detected   Parainfluenza Virus 4 Not Detected   RSV, PCR Not Detected   Bordetella pertussis pcr Not Detected   Bordetella parapertussis PCR Not Detected   Chlamydophila pneumoniae PCR Not Detected   Mycoplasma pneumo by PCR Not Detected              01/31/2021 0141 02/02/2021 0200 Blood Culture - Blood, Arm, Right [[id number]]   Blood from Arm, Right    Preliminary result Component Value   Blood Culture No growth at 2 days P              01/31/2021 0141 02/02/2021 0200 Blood Culture - Blood, Arm, Left [[id number]]   Blood from Arm, Left    Preliminary result Component Value   Blood Culture No growth at 2 days P                    RADIOLOGY:  Xr Chest 1 View    Result Date: 2/1/2021  1.  Improved bibasilar pulmonary opacities since radiograph earlier today. 2.  No other interval change. Electronically signed by:  Ale Nolan M.D.  2/1/2021 9:52 PM    Xr Chest 1 View    Result Date: 2/1/2021  1. Right IJ catheter tip is in the area of the superior vena cava. Negative for pneumothorax. 2. No significant change in the extensive bilateral interstitial and airspace opacities.  Electronically Signed  By-Jazmyn Auguste MD On:2/1/2021 6:54 PM This report was finalized on [id number] by  Jazmyn Auguste MD.    Xr Chest 1 View    Result Date: 2/1/2021  1.Mild worsening of diffuse airspace infiltrates and diffuse interstitial changes bilaterally. 2.The endotracheal tube is stable in position. 3.The nasogastric tube extends below the diaphragm to the stomach. 4.The right PICC line distal tip is seen at the SVC/right atrial junction. The line is in good position and is ready for use.  Electronically Signed By-Willard Garcia MD On:2/1/2021 7:53 AM This report was finalized on [id number] by  Willard Garcia MD.    Xr Chest 1 View    Result Date: 1/31/2021  : There is slight worsening of the airspace and interstitial opacity seen throughout the lungs bilaterally, left greater than right. Endotracheal tube remains present. The cardiac silhouette is not enlarged. Likely small left pleural effusion and probable trace right pleural effusion. Electronically signed by:  Maximus Kat D.O.  1/31/2021 5:02 AM    Xr Chest 1 View    Result Date: 1/31/2021  Impression: 1. The endotracheal tube tip projects 4.4 cm from the jeremiah. 2. Increased interstitial markings in both lungs. This may be chronic. This could alternatively represent interstitial edema. 3. Patchy airspace disease in the left base, suspicious for pneumonia. 4. Chronic findings suggestive of COPD/emphysema. Electronically signed by:  Dioni Tamayo M.D.  1/31/2021 2:15 AM    Ct Chest Pulmonary Embolism    Result Date: 1/31/2021  1. No evidence of pulmonary embolism. 2. No evidence of thoracic aortic aneurysm or dissection. 3. Findings in the lungs bilaterally likely relate to bilateral pneumonia. Follow to clearing recommended. 4. Severe emphysema. Electronically signed by:  Maximus Kat D.O.  1/31/2021 2:18 AM    Xr Abdomen Kub    Result Date: 1/31/2021  1. Gastric suction tube in expected position with tip terminating below the left hemidiaphragm.   Electronically Signed By-Suleman Fonseca MD On:1/31/2021 6:04 PM This report was finalized on [id number] by  Suleman Fonseca MD.    Xr Abdomen Kub    Result Date: 1/31/2021  1. Nonspecific nonobstructive small bowel gas pattern. 2. Moderate rectal stool burden. 3. Left basilar airspace disease.  Electronically Signed By-Suleman Fonseca MD On:1/31/2021 4:33 PM This report was finalized on [id number] by  Suleman Fonseca MD.      For more specific information regarding this patient’s hospital stay at [facility], please refer to patient’s full medical record.  This death summary has been scribed by this nurse practitioner for the attending physician, Dr. Alvares.    Electronically signed by CLAYTON Schumacher, 02/02/21 at 21:47 EST.       I personally have examined  and interviewed the patient. I have reviewed the history, data, problems, assessment and plan with our NP.  Critical care time in direct medical management (   ) minutes   Efrain Alvares MD, D,ABSM  2/2/2021

## 2021-02-02 NOTE — NURSING NOTE
Patient family was at bedside and made aware of the whole situation. Family has the desire to withdraw care and make the patient comfortable in order to let him pass naturally. I notified the provider, Uriel Peguero, he came to bedside to talk to family and discuss the process and how things will be addressed and taken care of. Patient was unresponsive, pupils with blown with no response, abdomen was distended and rigid, <30 mls of urine, no pulses were palpable in lower extremities. Multiple pressors were required to maintain adequate blood pressure.

## 2021-02-03 NOTE — PROGRESS NOTES
Case Management Discharge Note                Selected Continued Care - Discharged on 2021 Admission date: 2021 - Discharge disposition:                  Final Discharge Disposition Code: 41 -  in medical facility

## 2021-02-05 LAB
BACTERIA SPEC AEROBE CULT: NORMAL
BACTERIA SPEC AEROBE CULT: NORMAL